# Patient Record
Sex: MALE
[De-identification: names, ages, dates, MRNs, and addresses within clinical notes are randomized per-mention and may not be internally consistent; named-entity substitution may affect disease eponyms.]

---

## 2024-02-22 PROBLEM — Z00.00 ENCOUNTER FOR PREVENTIVE HEALTH EXAMINATION: Status: ACTIVE | Noted: 2024-02-22

## 2024-02-28 ENCOUNTER — APPOINTMENT (OUTPATIENT)
Dept: COLORECTAL SURGERY | Facility: CLINIC | Age: 74
End: 2024-02-28
Payer: MEDICARE

## 2024-02-28 VITALS
RESPIRATION RATE: 14 BRPM | HEIGHT: 68 IN | WEIGHT: 178 LBS | HEART RATE: 58 BPM | SYSTOLIC BLOOD PRESSURE: 124 MMHG | DIASTOLIC BLOOD PRESSURE: 80 MMHG | BODY MASS INDEX: 26.98 KG/M2 | TEMPERATURE: 97.5 F

## 2024-02-28 DIAGNOSIS — Z86.79 PERSONAL HISTORY OF OTHER DISEASES OF THE CIRCULATORY SYSTEM: ICD-10-CM

## 2024-02-28 DIAGNOSIS — K63.89 OTHER SPECIFIED DISEASES OF INTESTINE: ICD-10-CM

## 2024-02-28 DIAGNOSIS — Z86.39 PERSONAL HISTORY OF OTHER ENDOCRINE, NUTRITIONAL AND METABOLIC DISEASE: ICD-10-CM

## 2024-02-28 PROCEDURE — 99205 OFFICE O/P NEW HI 60 MIN: CPT

## 2024-02-28 RX ORDER — AMLODIPINE BESYLATE 10 MG/1
10 TABLET ORAL
Refills: 0 | Status: ACTIVE | COMMUNITY

## 2024-02-28 RX ORDER — LISINOPRIL 40 MG/1
40 TABLET ORAL
Refills: 0 | Status: ACTIVE | COMMUNITY

## 2024-02-28 RX ORDER — CHROMIUM 200 MCG
TABLET ORAL
Refills: 0 | Status: ACTIVE | COMMUNITY

## 2024-02-28 RX ORDER — EZETIMIBE 10 MG/1
10 TABLET ORAL
Refills: 0 | Status: ACTIVE | COMMUNITY

## 2024-02-28 RX ORDER — ATENOLOL 25 MG/1
25 TABLET ORAL
Refills: 0 | Status: ACTIVE | COMMUNITY

## 2024-02-28 RX ORDER — ATORVASTATIN CALCIUM 20 MG/1
20 TABLET, FILM COATED ORAL
Refills: 0 | Status: ACTIVE | COMMUNITY

## 2024-02-28 RX ORDER — ASPIRIN 325 MG/1
325 TABLET, COATED ORAL
Refills: 0 | Status: ACTIVE | COMMUNITY

## 2024-02-28 RX ORDER — METHIMAZOLE 5 MG/1
5 TABLET ORAL
Refills: 0 | Status: ACTIVE | COMMUNITY

## 2024-02-29 NOTE — PHYSICAL EXAM
[Normal Breath Sounds] : Normal breath sounds [Normal Heart Sounds] : normal heart sounds [Normal Rate and Rhythm] : normal rate and rhythm [No Edema] : No edema [Alert] : alert [Oriented to Person] : oriented to person [Oriented to Place] : oriented to place [Oriented to Time] : oriented to time [Calm] : calm [de-identified] : round soft +BS NT slightly distended [de-identified] : NC/AT [de-identified] : intact [de-identified] : +ROM

## 2024-02-29 NOTE — HISTORY OF PRESENT ILLNESS
[FreeTextEntry1] : 74yo [place] male who developed new onset constipation in September 2023. The constipation worsened over time and he subsequently developed abdominal pain, occasional blood in his stool, and a 12lb unintended weight loss since December 2023. Patient mentioned his symptoms to his cardiologist who referred patient to GI Dr. Farrar for colonoscopy. Colonoscopy was postponed to February after the patient had COVID in January 2024. An apple-core left side lesion which could be transversed with the scope was found, biopsy is pending.   The official colonoscopy report and CT C/A/P reports are not available in our system as of the time of this appointment.

## 2024-02-29 NOTE — ASSESSMENT
[FreeTextEntry1] : Pleasant 73-year-old male who presents for consultation regarding newly diagnosed carcinoma of the left colon.  He developed constipation with new abdominal pain and weight loss in fall/winter 2023. Unfortunately screening colonoscopy was postponed to February 2024 as the patient caught COVID. He was found to have an apple-core lesion of the descending  colon which could be traversed with the scope. Biopsy results are pending. He also underwent CT C/A/P and the results are not yet in our system.    His past medical history includes hypertension. His only surgical history includes testicular surgery.  We will begin by obtaining  the results of CT scan of the chest, abdomen and pelvis. If no abnormalities are seen  we plan to proceed with surgical resection. Laparoscopic assisted left colectomy was discussed with the patient including anticipated operative time, hospital stay and time to complete recuperation. Risks, alternatives and benefits including but not limited to anastomotic leak, wound infection and deep venous thrombosis were discussed. Final pathologic staging including the possible need for adjuvant chemotherapy was also discussed.  Questions were answered and he wishes to proceed. We will be doing this procedure with an ERP protocol.

## 2024-02-29 NOTE — REVIEW OF SYSTEMS
[As Noted in HPI] : as noted in HPI [Negative] : Endocrine [Chest Pain] : no chest pain [Cough] : no cough [Easy Bleeding] : no tendency for easy bleeding [Easy Bruising] : no tendency for easy bruising [FreeTextEntry5] : ST scheduled for June 2024 (last ST Feb 2023

## 2024-03-08 ENCOUNTER — OUTPATIENT (OUTPATIENT)
Dept: OUTPATIENT SERVICES | Facility: HOSPITAL | Age: 74
LOS: 1 days | End: 2024-03-08
Payer: MEDICARE

## 2024-03-08 VITALS
RESPIRATION RATE: 18 BRPM | TEMPERATURE: 99 F | HEIGHT: 67 IN | DIASTOLIC BLOOD PRESSURE: 79 MMHG | OXYGEN SATURATION: 96 % | SYSTOLIC BLOOD PRESSURE: 120 MMHG | HEART RATE: 57 BPM | WEIGHT: 182.1 LBS

## 2024-03-08 DIAGNOSIS — K63.89 OTHER SPECIFIED DISEASES OF INTESTINE: ICD-10-CM

## 2024-03-08 DIAGNOSIS — Z98.890 OTHER SPECIFIED POSTPROCEDURAL STATES: Chronic | ICD-10-CM

## 2024-03-08 DIAGNOSIS — Z01.818 ENCOUNTER FOR OTHER PREPROCEDURAL EXAMINATION: ICD-10-CM

## 2024-03-08 DIAGNOSIS — Z29.9 ENCOUNTER FOR PROPHYLACTIC MEASURES, UNSPECIFIED: ICD-10-CM

## 2024-03-08 LAB
A1C WITH ESTIMATED AVERAGE GLUCOSE RESULT: 5.7 % — HIGH (ref 4–5.6)
ANION GAP SERPL CALC-SCNC: 9 MMOL/L — SIGNIFICANT CHANGE UP (ref 5–17)
BLD GP AB SCN SERPL QL: NEGATIVE — SIGNIFICANT CHANGE UP
BUN SERPL-MCNC: 13 MG/DL — SIGNIFICANT CHANGE UP (ref 7–23)
CALCIUM SERPL-MCNC: 9.8 MG/DL — SIGNIFICANT CHANGE UP (ref 8.4–10.5)
CEA SERPL-MCNC: 1 NG/ML — SIGNIFICANT CHANGE UP (ref 0–3.8)
CHLORIDE SERPL-SCNC: 104 MMOL/L — SIGNIFICANT CHANGE UP (ref 96–108)
CO2 SERPL-SCNC: 28 MMOL/L — SIGNIFICANT CHANGE UP (ref 22–31)
CREAT SERPL-MCNC: 1.04 MG/DL — SIGNIFICANT CHANGE UP (ref 0.5–1.3)
EGFR: 75 ML/MIN/1.73M2 — SIGNIFICANT CHANGE UP
ESTIMATED AVERAGE GLUCOSE: 117 MG/DL — HIGH (ref 68–114)
GLUCOSE SERPL-MCNC: 107 MG/DL — HIGH (ref 70–99)
HCT VFR BLD CALC: 46 % — SIGNIFICANT CHANGE UP (ref 39–50)
HGB BLD-MCNC: 15.5 G/DL — SIGNIFICANT CHANGE UP (ref 13–17)
MCHC RBC-ENTMCNC: 27.9 PG — SIGNIFICANT CHANGE UP (ref 27–34)
MCHC RBC-ENTMCNC: 33.7 GM/DL — SIGNIFICANT CHANGE UP (ref 32–36)
MCV RBC AUTO: 82.7 FL — SIGNIFICANT CHANGE UP (ref 80–100)
NRBC # BLD: 0 /100 WBCS — SIGNIFICANT CHANGE UP (ref 0–0)
PLATELET # BLD AUTO: 203 K/UL — SIGNIFICANT CHANGE UP (ref 150–400)
POTASSIUM SERPL-MCNC: 4.3 MMOL/L — SIGNIFICANT CHANGE UP (ref 3.5–5.3)
POTASSIUM SERPL-SCNC: 4.3 MMOL/L — SIGNIFICANT CHANGE UP (ref 3.5–5.3)
RBC # BLD: 5.56 M/UL — SIGNIFICANT CHANGE UP (ref 4.2–5.8)
RBC # FLD: 14.3 % — SIGNIFICANT CHANGE UP (ref 10.3–14.5)
RH IG SCN BLD-IMP: POSITIVE — SIGNIFICANT CHANGE UP
SODIUM SERPL-SCNC: 141 MMOL/L — SIGNIFICANT CHANGE UP (ref 135–145)
WBC # BLD: 12.25 K/UL — HIGH (ref 3.8–10.5)
WBC # FLD AUTO: 12.25 K/UL — HIGH (ref 3.8–10.5)

## 2024-03-08 PROCEDURE — 80048 BASIC METABOLIC PNL TOTAL CA: CPT

## 2024-03-08 PROCEDURE — G0463: CPT

## 2024-03-08 PROCEDURE — 82378 CARCINOEMBRYONIC ANTIGEN: CPT

## 2024-03-08 PROCEDURE — 36415 COLL VENOUS BLD VENIPUNCTURE: CPT

## 2024-03-08 PROCEDURE — 86850 RBC ANTIBODY SCREEN: CPT

## 2024-03-08 PROCEDURE — 86901 BLOOD TYPING SEROLOGIC RH(D): CPT

## 2024-03-08 PROCEDURE — 86900 BLOOD TYPING SEROLOGIC ABO: CPT

## 2024-03-08 PROCEDURE — 83036 HEMOGLOBIN GLYCOSYLATED A1C: CPT

## 2024-03-08 PROCEDURE — 85027 COMPLETE CBC AUTOMATED: CPT

## 2024-03-08 NOTE — H&P PST ADULT - PROBLEM SELECTOR PLAN 1
Scheduled for laparoscopic lower anterior resection   preop instruction provided in writing and discussed, both patient and his wife verbalized understanding and teach back   fs on admit  ERP protocol   patient will continue aspirin perio-op

## 2024-03-08 NOTE — H&P PST ADULT - HISTORY OF PRESENT ILLNESS
73 year old male with h/o previous MI s/p cardiac angio with stenting x 25 years ago (on aspirin, denies any CP), hypertension, thyroid nodule, hypercholesterolemia, renal calculus in the past, presents to [facility] for scheduled laparoscopic lower anterior resection for disease of intestine. He denies any abdomina pain, no rectal bleeding, no N/V. Endorses having difficulty moving his bowel, prompted a colonoscopy, which revealed a large colon mass, now scheduled for surgery. 73 year old male with h/o previous MI s/p cardiac cath with stenting x 25 years ago (on aspirin, denies any CP), hypertension, thyroid nodule, hypercholesterolemia, BPH, renal calculus in the past, presents to [facility] for scheduled laparoscopic lower anterior resection for disease of intestine. He denies any abdomina pain, no rectal bleeding, no N/V. Endorses having difficulty moving his bowel, prompted a colonoscopy, which revealed a large colon mass, now scheduled for surgery.

## 2024-03-08 NOTE — H&P PST ADULT - NSICDXPASTMEDICALHX_GEN_ALL_CORE_FT
PAST MEDICAL HISTORY:  H/O basal cell carcinoma excision     H/O thyroid nodule     History of BPH     Hypercholesterolemia     Hypertension     MI (myocardial infarction)     Other specified diseases of intestine     Renal calculus      PAST MEDICAL HISTORY:  H/O basal cell carcinoma excision     H/O thyroid nodule     History of BPH     Hypercholesterolemia     Hypertension     MI (myocardial infarction)     Other specified diseases of intestine     Renal calculus     S/P repair of hydrocele

## 2024-03-08 NOTE — H&P PST ADULT - ASSESSMENT
Dasi activities: walking 1-1.5 miles (3x/week) Dasi activities: walking 1-1.5 miles (3x/week), able to walk up 1-2 flight stair without sob, no CP  Dasi score: 6.04  no loose or removable teeth     CAPRINI SCORE [CLOT updated 18]    AGE RELATED RISK FACTORS                                                       MOBILITY RELATED FACTORS  [ ] Age 41-60 years                                            (1 Point)                    [ ] Bed rest                                                        (1 Point)  [ ] Age: 61-74 years                                           (2 Points)                  [ ] Plaster cast                                                   (2 Points)  [ 3] Age= 75 years                                              (3 Points)                    [ ] Bed bound for more than 72 hours                 (2 Points)    DISEASE RELATED RISK FACTORS                                               GENDER SPECIFIC FACTORS  [ ] Edema in the lower extremities                       (1 Point)              [ ] Pregnancy                                                     (1 Point)  [ ] Varicose veins                                               (1 Point)                     [ ] Post-partum < 6 weeks                                   (1 Point)             [1 ] BMI > 25 Kg/m2                                            (1 Point)                     [ ] Hormonal therapy  or oral contraception          (1 Point)                 [ ] Sepsis (in the previous month)                        (1 Point)               [ ] History of pregnancy complications                 (1 point)  [ ] Pneumonia or serious lung disease                                               [ ] Unexplained or recurrent                     (1 Point)           (in the previous month)                               (1 Point)  [ ] Abnormal pulmonary function test                     (1 Point)                 SURGERY RELATED RISK FACTORS  [ ] Acute myocardial infarction                              (1 Point)               [ ]  Section                                             (1 Point)  [ ] Congestive heart failure (in the previous month)  (1 Point)      [ ] Minor surgery                                                  (1 Point)   [ ] Inflammatory bowel disease                             (1 Point)               [ ] Arthroscopic surgery                                        (2 Points)  [ ] Central venous access                                      (2 Points)                [2 ] General surgery lasting more than 45 minutes (2 points)  [2 ] Present or previous malignancy                     (2 Points)                [ ] Elective arthroplasty                                         (5 points)    [ ] Stroke (in the previous month)                          (5 Points)                                                                                                                                                           HEMATOLOGY RELATED FACTORS                                                 TRAUMA RELATED RISK FACTORS  [ ] Prior episodes of VTE                                     (3 Points)                [ ] Fracture of the hip, pelvis, or leg                       (5 Points)  [ ] Positive family history for VTE                         (3 Points)             [ ] Acute spinal cord injury (in the previous month)  (5 Points)  [ ] Prothrombin 10410 A                                     (3 Points)               [ ] Paralysis  (less than 1 month)                             (5 Points)  [ ] Factor V Leiden                                             (3 Points)                  [ ] Multiple Trauma within 1 month                        (5 Points)  [ ] Lupus anticoagulants                                     (3 Points)                                                           [ ] Anticardiolipin antibodies                               (3 Points)                                                       [ ] High homocysteine in the blood                      (3 Points)                                             [ ] Other congenital or acquired thrombophilia      (3 Points)                                                [ ] Heparin induced thrombocytopenia                  (3 Points)                                     Total Score [ 7]

## 2024-03-08 NOTE — H&P PST ADULT - NS PRO FEM  PAP SMEARS 3YRS
From: Amita Harvey  To: Tim Robert  Sent: 9/26/2023 9:09 AM CDT  Subject: extension on work letter    Heltyler Robert, I was in yesterday and discussed if needed another day off of work I could just send a message. I did have to stay home today due to the cough is still constant and the cough syrup causing slight allergic reaction last night (hives with a little swelling of the eyes). I did take benadryl and it did help. I will continue the pills as I was only able to take 1 yesterday as they weren't ready for  until 7pm.   
not applicable (Male)

## 2024-03-08 NOTE — H&P PST ADULT - AS BP NONINV METHOD
Electrodesiccation And Curettage Text: The wound bed was treated with electrodesiccation and curettage after the biopsy was performed. electronic

## 2024-03-08 NOTE — H&P PST ADULT - NSICDXPROCEDURE_GEN_ALL_CORE_FT
PROCEDURES:  Resection, colon, anterior 08-Mar-2024 08:57:49 disease of intestine Elizabeth Norton

## 2024-03-08 NOTE — H&P PST ADULT - NSICDXPASTSURGICALHX_GEN_ALL_CORE_FT
PAST SURGICAL HISTORY:  H/O basal cell carcinoma excision     H/O coronary angiogram     History of colonoscopy     History of coronary angiogram

## 2024-03-11 ENCOUNTER — TRANSCRIPTION ENCOUNTER (OUTPATIENT)
Age: 74
End: 2024-03-11

## 2024-03-12 ENCOUNTER — RESULT REVIEW (OUTPATIENT)
Age: 74
End: 2024-03-12

## 2024-03-12 ENCOUNTER — INPATIENT (INPATIENT)
Facility: HOSPITAL | Age: 74
LOS: 1 days | Discharge: HOME | DRG: 330 | End: 2024-03-14
Attending: SURGERY | Admitting: SURGERY
Payer: MEDICARE

## 2024-03-12 ENCOUNTER — APPOINTMENT (OUTPATIENT)
Dept: COLORECTAL SURGERY | Facility: HOSPITAL | Age: 74
End: 2024-03-12
Payer: MEDICARE

## 2024-03-12 VITALS
SYSTOLIC BLOOD PRESSURE: 136 MMHG | WEIGHT: 182.1 LBS | OXYGEN SATURATION: 97 % | TEMPERATURE: 98 F | DIASTOLIC BLOOD PRESSURE: 59 MMHG | HEART RATE: 61 BPM | HEIGHT: 67.01 IN | RESPIRATION RATE: 17 BRPM

## 2024-03-12 DIAGNOSIS — Z98.890 OTHER SPECIFIED POSTPROCEDURAL STATES: Chronic | ICD-10-CM

## 2024-03-12 DIAGNOSIS — K63.89 OTHER SPECIFIED DISEASES OF INTESTINE: ICD-10-CM

## 2024-03-12 LAB
GLUCOSE BLDC GLUCOMTR-MCNC: 208 MG/DL — HIGH (ref 70–99)
RH IG SCN BLD-IMP: POSITIVE — SIGNIFICANT CHANGE UP

## 2024-03-12 PROCEDURE — 88342 IMHCHEM/IMCYTCHM 1ST ANTB: CPT | Mod: 26

## 2024-03-12 PROCEDURE — 52005 CYSTO W/URTRL CATHJ: CPT

## 2024-03-12 PROCEDURE — 44207 L COLECTOMY/COLOPROCTOSTOMY: CPT

## 2024-03-12 PROCEDURE — 88304 TISSUE EXAM BY PATHOLOGIST: CPT | Mod: 26

## 2024-03-12 PROCEDURE — 44213 LAP MOBIL SPLENIC FL ADD-ON: CPT

## 2024-03-12 PROCEDURE — 88341 IMHCHEM/IMCYTCHM EA ADD ANTB: CPT | Mod: 26

## 2024-03-12 PROCEDURE — 45300 PROCTOSIGMOIDOSCOPY DX: CPT

## 2024-03-12 PROCEDURE — 88309 TISSUE EXAM BY PATHOLOGIST: CPT | Mod: 26

## 2024-03-12 DEVICE — VISTASEAL FIBRIN HUMAN 4ML: Type: IMPLANTABLE DEVICE | Status: FUNCTIONAL

## 2024-03-12 DEVICE — GUIDEWIRE SENSOR DUAL-FLEX NITINOL STRAIGHT .035" X 150CM: Type: IMPLANTABLE DEVICE | Status: FUNCTIONAL

## 2024-03-12 DEVICE — STAPLER ETHICON CIRCULAR XL 29MM: Type: IMPLANTABLE DEVICE | Status: FUNCTIONAL

## 2024-03-12 DEVICE — URETERAL CATH OPEN END 5FR 70CM: Type: IMPLANTABLE DEVICE | Status: FUNCTIONAL

## 2024-03-12 DEVICE — STAPLER COVIDIEN PURSTRING 65MM: Type: IMPLANTABLE DEVICE | Status: FUNCTIONAL

## 2024-03-12 DEVICE — STAPLER CONTOUR CURVED WITH BLUE CART: Type: IMPLANTABLE DEVICE | Status: FUNCTIONAL

## 2024-03-12 RX ORDER — ATENOLOL 25 MG/1
25 TABLET ORAL AT BEDTIME
Refills: 0 | Status: DISCONTINUED | OUTPATIENT
Start: 2024-03-12 | End: 2024-03-14

## 2024-03-12 RX ORDER — HYDROMORPHONE HYDROCHLORIDE 2 MG/ML
0.25 INJECTION INTRAMUSCULAR; INTRAVENOUS; SUBCUTANEOUS
Refills: 0 | Status: DISCONTINUED | OUTPATIENT
Start: 2024-03-12 | End: 2024-03-12

## 2024-03-12 RX ORDER — METHIMAZOLE 10 MG/1
1 TABLET ORAL
Refills: 0 | DISCHARGE

## 2024-03-12 RX ORDER — INFLUENZA VIRUS VACCINE 15; 15; 15; 15 UG/.5ML; UG/.5ML; UG/.5ML; UG/.5ML
0.7 SUSPENSION INTRAMUSCULAR ONCE
Refills: 0 | Status: DISCONTINUED | OUTPATIENT
Start: 2024-03-12 | End: 2024-03-14

## 2024-03-12 RX ORDER — ATORVASTATIN CALCIUM 80 MG/1
1 TABLET, FILM COATED ORAL
Refills: 0 | DISCHARGE

## 2024-03-12 RX ORDER — SODIUM CHLORIDE 9 MG/ML
1000 INJECTION, SOLUTION INTRAVENOUS
Refills: 0 | Status: DISCONTINUED | OUTPATIENT
Start: 2024-03-12 | End: 2024-03-14

## 2024-03-12 RX ORDER — MORPHINE SULFATE 50 MG/1
0.2 CAPSULE, EXTENDED RELEASE ORAL ONCE
Refills: 0 | Status: DISCONTINUED | OUTPATIENT
Start: 2024-03-12 | End: 2024-03-13

## 2024-03-12 RX ORDER — HYDROMORPHONE HYDROCHLORIDE 2 MG/ML
0.5 INJECTION INTRAMUSCULAR; INTRAVENOUS; SUBCUTANEOUS
Refills: 0 | Status: DISCONTINUED | OUTPATIENT
Start: 2024-03-12 | End: 2024-03-13

## 2024-03-12 RX ORDER — CEFOTETAN DISODIUM 1 G
2 VIAL (EA) INJECTION ONCE
Refills: 0 | Status: DISCONTINUED | OUTPATIENT
Start: 2024-03-12 | End: 2024-03-13

## 2024-03-12 RX ORDER — LIDOCAINE HCL 20 MG/ML
0.2 VIAL (ML) INJECTION ONCE
Refills: 0 | Status: CANCELLED | OUTPATIENT
Start: 2024-03-14 | End: 2024-03-12

## 2024-03-12 RX ORDER — OXYCODONE HYDROCHLORIDE 5 MG/1
5 TABLET ORAL EVERY 4 HOURS
Refills: 0 | Status: DISCONTINUED | OUTPATIENT
Start: 2024-03-12 | End: 2024-03-14

## 2024-03-12 RX ORDER — CELECOXIB 200 MG/1
400 CAPSULE ORAL ONCE
Refills: 0 | Status: COMPLETED | OUTPATIENT
Start: 2024-03-12 | End: 2024-03-12

## 2024-03-12 RX ORDER — AMLODIPINE BESYLATE 2.5 MG/1
1 TABLET ORAL
Refills: 0 | DISCHARGE

## 2024-03-12 RX ORDER — LISINOPRIL 2.5 MG/1
1 TABLET ORAL
Refills: 0 | DISCHARGE

## 2024-03-12 RX ORDER — AMLODIPINE BESYLATE 2.5 MG/1
10 TABLET ORAL DAILY
Refills: 0 | Status: DISCONTINUED | OUTPATIENT
Start: 2024-03-12 | End: 2024-03-14

## 2024-03-12 RX ORDER — EZETIMIBE 10 MG/1
1 TABLET ORAL
Refills: 0 | DISCHARGE

## 2024-03-12 RX ORDER — ONDANSETRON 8 MG/1
4 TABLET, FILM COATED ORAL EVERY 6 HOURS
Refills: 0 | Status: DISCONTINUED | OUTPATIENT
Start: 2024-03-12 | End: 2024-03-13

## 2024-03-12 RX ORDER — ATENOLOL 25 MG/1
1 TABLET ORAL
Refills: 0 | DISCHARGE

## 2024-03-12 RX ORDER — ACETAMINOPHEN 500 MG
1000 TABLET ORAL EVERY 6 HOURS
Refills: 0 | Status: COMPLETED | OUTPATIENT
Start: 2024-03-12 | End: 2024-03-13

## 2024-03-12 RX ORDER — ASPIRIN/CALCIUM CARB/MAGNESIUM 324 MG
1 TABLET ORAL
Refills: 0 | DISCHARGE

## 2024-03-12 RX ORDER — CHLORHEXIDINE GLUCONATE 213 G/1000ML
1 SOLUTION TOPICAL ONCE
Refills: 0 | Status: DISCONTINUED | OUTPATIENT
Start: 2024-03-12 | End: 2024-03-12

## 2024-03-12 RX ORDER — NALOXONE HYDROCHLORIDE 4 MG/.1ML
0.1 SPRAY NASAL
Refills: 0 | Status: DISCONTINUED | OUTPATIENT
Start: 2024-03-12 | End: 2024-03-13

## 2024-03-12 RX ORDER — OXYCODONE HYDROCHLORIDE 5 MG/1
2.5 TABLET ORAL EVERY 4 HOURS
Refills: 0 | Status: DISCONTINUED | OUTPATIENT
Start: 2024-03-12 | End: 2024-03-14

## 2024-03-12 RX ORDER — HEPARIN SODIUM 5000 [USP'U]/ML
5000 INJECTION INTRAVENOUS; SUBCUTANEOUS EVERY 8 HOURS
Refills: 0 | Status: DISCONTINUED | OUTPATIENT
Start: 2024-03-12 | End: 2024-03-14

## 2024-03-12 RX ORDER — SODIUM CHLORIDE 9 MG/ML
3 INJECTION INTRAMUSCULAR; INTRAVENOUS; SUBCUTANEOUS EVERY 8 HOURS
Refills: 0 | Status: CANCELLED | OUTPATIENT
Start: 2024-03-14 | End: 2024-03-12

## 2024-03-12 RX ADMIN — HEPARIN SODIUM 5000 UNIT(S): 5000 INJECTION INTRAVENOUS; SUBCUTANEOUS at 22:27

## 2024-03-12 RX ADMIN — CELECOXIB 400 MILLIGRAM(S): 200 CAPSULE ORAL at 12:14

## 2024-03-12 RX ADMIN — Medication 400 MILLIGRAM(S): at 22:27

## 2024-03-12 RX ADMIN — Medication 1000 MILLIGRAM(S): at 22:57

## 2024-03-12 NOTE — BRIEF OPERATIVE NOTE - OPERATION/FINDINGS
Pre-op bilateral ucath placement by urology. Lower midline incision, sigmoid mobilized from top of rectum to sigmoid. Mass noted at mid descending colon. Hand assist port placed in incision, supraumbilical eduardo port placed and mid left abdominal port placed. Colon mobilized from descending to mid transverse, high splenic flexure takedown. High ligation of CEASAR. Bowel extracorporealized, top of rectum transected with contour stapler, proximal margin of specimen transected with pursestring device 8cm proximal to mass. Colorectal anastomosis performed with 29 EEA stapler, donuts intact. Rigid sig performed, anastomosis 17 cm from anal verge, negative leak test.

## 2024-03-12 NOTE — PATIENT PROFILE ADULT - HAS THE PATIENT RECEIVED THE INFLUENZA VACCINE THIS SEASON?
BRIEF OPERATIVE NOTE    Date of Procedure: 11/13/2018   Preoperative Diagnosis:  Lymphadenopathy. Postoperative Diagnosis:  Same. Procedure(s):   Excisional Biopsy Right Inguinal Lymph Node. Surgeon(s) and Role:   Jessica Quijano MD - Primary  Surgical Staff:  Circ-1: Akiko Michael RN  Scrub RN-1: Flex Jung RN  Surg Asst-1: Tawanda Earthly  Event Time In Time Out   Incision Start 11/13/2018 1319    Incision Close 11/13/2018 1345      Anesthesia: General   Estimated Blood Loss: Minimal.  Specimens:   ID Type Source Tests Collected by Time Destination   1 : right inguinal lymph node Fresh Lymph Node  Jessica Quijano MD 11/13/2018 1333 Pathology      Findings: Enlarged right inguinal lymph node. Complications: None.   Implants: * No implants in log * no...

## 2024-03-12 NOTE — PRE-ANESTHESIA EVALUATION ADULT - NSANTHPMHFT_GEN_ALL_CORE
chart and consultant notes reviewed. cad s/p remote stent - stable cv clinical baseline, states et > 1 flight, no cp/sob. 2023 stress shows no reversible defect, ef 50%

## 2024-03-12 NOTE — CHART NOTE - NSCHARTNOTEFT_GEN_A_CORE
POST-OP NOTE    HONEY VALENZUELA | [id number] | [facility] 2MON 210 W1    Procedure: s/p     Subjective:    Vital Signs Last 24 Hrs  T(C): 36.4 (12 Mar 2024 22:25), Max: 36.8 (12 Mar 2024 12:18)  T(F): 97.6 (12 Mar 2024 22:25), Max: 98.2 (12 Mar 2024 12:18)  HR: 58 (12 Mar 2024 22:25) (55 - 68)  BP: 121/60 (12 Mar 2024 22:25) (86/59 - 136/59)  BP(mean): 79 (12 Mar 2024 21:15) (68 - 86)  RR: 16 (12 Mar 2024 22:25) (16 - 17)  SpO2: 99% (12 Mar 2024 22:25) (95% - 100%)    Parameters below as of 12 Mar 2024 22:25  Patient On (Oxygen Delivery Method): nasal cannula  O2 Flow (L/min): 2    I&O's Summary    12 Mar 2024 07:01  -  12 Mar 2024 23:33  --------------------------------------------------------  IN: 120 mL / OUT: 135 mL / NET: -15 mL                 PHYSICAL EXAM:  Gen: NAD  Resp: breathing easily, no stridor  CV: RRR  Abdomen: soft, nontender, nondistended  Skin: Incision c/d/i. Normal color, no rashes or lesions POST-OP NOTE    HONEY VALENZUELA | [id number] | [facility] 2MON [address]1    Procedure: s/p      Subjective:    Vital Signs Last 24 Hrs  T(C): 36.4 (12 Mar 2024 22:25), Max: 36.8 (12 Mar 2024 12:18)  T(F): 97.6 (12 Mar 2024 22:25), Max: 98.2 (12 Mar 2024 12:18)  HR: 58 (12 Mar 2024 22:25) (55 - 68)  BP: 121/60 (12 Mar 2024 22:25) (86/59 - 136/59)  BP(mean): 79 (12 Mar 2024 21:15) (68 - 86)  RR: 16 (12 Mar 2024 22:25) (16 - 17)  SpO2: 99% (12 Mar 2024 22:25) (95% - 100%)    Parameters below as of 12 Mar 2024 22:25  Patient On (Oxygen Delivery Method): nasal cannula  O2 Flow (L/min): 2    I&O's Summary    12 Mar 2024 07:01  -  12 Mar 2024 23:33  --------------------------------------------------------  IN: 120 mL / OUT: 135 mL / NET: -15 mL                 PHYSICAL EXAM:  Gen: NAD  Resp: breathing easily, no stridor  CV: RRR  Abdomen: soft, nontender, nondistended  Skin: Incision c/d/i. Normal color, no rashes or lesions POST-OP NOTE    HONEY VALENZUELA | [id number] | [facility] 2MON [address] W1    Procedure: s/p lap hand assisted anterior resection with primary anastomosis.     Subjective: pt reports minimal pain.  tolerated CLD. OOB. no flatus. no BM    Vital Signs Last 24 Hrs  T(C): 36.4 (12 Mar 2024 22:25), Max: 36.8 (12 Mar 2024 12:18)  T(F): 97.6 (12 Mar 2024 22:25), Max: 98.2 (12 Mar 2024 12:18)  HR: 58 (12 Mar 2024 22:25) (55 - 68)  BP: 121/60 (12 Mar 2024 22:25) (86/59 - 136/59)  BP(mean): 79 (12 Mar 2024 21:15) (68 - 86)  RR: 16 (12 Mar 2024 22:25) (16 - 17)  SpO2: 99% (12 Mar 2024 22:25) (95% - 100%)    Parameters below as of 12 Mar 2024 22:25  Patient On (Oxygen Delivery Method): nasal cannula  O2 Flow (L/min): 2    I&O's Summary    12 Mar 2024 07:01  -  12 Mar 2024 23:33  --------------------------------------------------------  IN: 120 mL / OUT: 135 mL / NET: -15 mL                 PHYSICAL EXAM:  Gen: NAD  Resp: breathing easily, no stridor  CV: RRR  : goodrich in place with blood in urine.   Abdomen: soft, nontender, mildly distended. Incision sites x4 with dressings c/d/i.    A/P: 73M with left sided colon CA s/p laparoscopic hand assisted anterior resection with primary anastomosis. pt received spinal Duramorph intraop. pt is recovering well.     - ERP  - goodrich to be removed AM  - pain control  - SQH  - CLD   - OOB    Red Team surgery POST-OP NOTE    HONEY VALENZUELA | [id number] | [facility] 2MON [address] W1    Procedure: s/p lap hand assisted anterior resection with primary anastomosis.     Subjective: pt reports minimal pain. Denies nausea, vomiting. tolerated CLD. OOB. no flatus. no BM    Vital Signs Last 24 Hrs  T(C): 36.4 (12 Mar 2024 22:25), Max: 36.8 (12 Mar 2024 12:18)  T(F): 97.6 (12 Mar 2024 22:25), Max: 98.2 (12 Mar 2024 12:18)  HR: 58 (12 Mar 2024 22:25) (55 - 68)  BP: 121/60 (12 Mar 2024 22:25) (86/59 - 136/59)  BP(mean): 79 (12 Mar 2024 21:15) (68 - 86)  RR: 16 (12 Mar 2024 22:25) (16 - 17)  SpO2: 99% (12 Mar 2024 22:25) (95% - 100%)    Parameters below as of 12 Mar 2024 22:25  Patient On (Oxygen Delivery Method): nasal cannula  O2 Flow (L/min): 2    I&O's Summary    12 Mar 2024 07:01  -  12 Mar 2024 23:33  --------------------------------------------------------  IN: 120 mL / OUT: 135 mL / NET: -15 mL                 PHYSICAL EXAM:  Gen: NAD  Resp: breathing easily, no stridor  CV: RRR  : goodrich in place with blood in urine.   Abdomen: soft, nontender, mildly distended. Incision sites x4 with dressings c/d/i.    A/P: 73M with left sided colon CA s/p laparoscopic hand assisted anterior resection with primary anastomosis. pt received spinal Duramorph intraop. pt is recovering well.     - ERP  - mointor goodrich output  - pain control  - SQH  - CLD   - OOB    Red Team surgery POST-OP NOTE    HONEY VALENZUELA | [id number] | [facility] 2MON 210 W1    Procedure: s/p lap hand assisted anterior resection with primary anastomosis.     Subjective: pt reports minimal pain. Denies nausea, vomiting. tolerated CLD. OOB. no flatus. no BM    Vital Signs Last 24 Hrs  T(C): 36.4 (12 Mar 2024 22:25), Max: 36.8 (12 Mar 2024 12:18)  T(F): 97.6 (12 Mar 2024 22:25), Max: 98.2 (12 Mar 2024 12:18)  HR: 58 (12 Mar 2024 22:25) (55 - 68)  BP: 121/60 (12 Mar 2024 22:25) (86/59 - 136/59)  BP(mean): 79 (12 Mar 2024 21:15) (68 - 86)  RR: 16 (12 Mar 2024 22:25) (16 - 17)  SpO2: 99% (12 Mar 2024 22:25) (95% - 100%)    Parameters below as of 12 Mar 2024 22:25  Patient On (Oxygen Delivery Method): nasal cannula  O2 Flow (L/min): 2    I&O's Summary    12 Mar 2024 07:01  -  12 Mar 2024 23:33  --------------------------------------------------------  IN: 120 mL / OUT: 135 mL / NET: -15 mL                 PHYSICAL EXAM:  Gen: NAD  Resp: breathing easily, no stridor  CV: RRR  : goodrich in place with blood in urine.   Abdomen: soft, nontender, mildly distended. Incision sites dressings c/d/i.    A/P: 73M with left sided colon CA s/p laparoscopic hand assisted anterior resection with primary anastomosis. pt received spinal Duramorph intraop. pt is recovering well.     - ERP  - mointor goodrich output  - pain control  - SQH  - CLD   - OOB    Red Team surgery

## 2024-03-12 NOTE — PATIENT PROFILE ADULT - CHOOSE INDICATION TO IMMUNIZE (AN ORDER WILL BE GENERATED WHEN THIS NOTE IS SAVED):
related to inadequate protein-energy intake and increased physiological demand in the setting of ESRD-HD
Patient is not pregnant (male or female)

## 2024-03-13 LAB
ANION GAP SERPL CALC-SCNC: 14 MMOL/L — SIGNIFICANT CHANGE UP (ref 5–17)
BUN SERPL-MCNC: 22 MG/DL — SIGNIFICANT CHANGE UP (ref 7–23)
CALCIUM SERPL-MCNC: 8.2 MG/DL — LOW (ref 8.4–10.5)
CHLORIDE SERPL-SCNC: 102 MMOL/L — SIGNIFICANT CHANGE UP (ref 96–108)
CO2 SERPL-SCNC: 21 MMOL/L — LOW (ref 22–31)
CREAT SERPL-MCNC: 1.23 MG/DL — SIGNIFICANT CHANGE UP (ref 0.5–1.3)
EGFR: 61 ML/MIN/1.73M2 — SIGNIFICANT CHANGE UP
GLUCOSE SERPL-MCNC: 163 MG/DL — HIGH (ref 70–99)
HCT VFR BLD CALC: 37.2 % — LOW (ref 39–50)
HGB BLD-MCNC: 13.2 G/DL — SIGNIFICANT CHANGE UP (ref 13–17)
MAGNESIUM SERPL-MCNC: 2 MG/DL — SIGNIFICANT CHANGE UP (ref 1.6–2.6)
MCHC RBC-ENTMCNC: 28.3 PG — SIGNIFICANT CHANGE UP (ref 27–34)
MCHC RBC-ENTMCNC: 35.5 GM/DL — SIGNIFICANT CHANGE UP (ref 32–36)
MCV RBC AUTO: 79.7 FL — LOW (ref 80–100)
NRBC # BLD: 0 /100 WBCS — SIGNIFICANT CHANGE UP (ref 0–0)
PHOSPHATE SERPL-MCNC: 3.5 MG/DL — SIGNIFICANT CHANGE UP (ref 2.5–4.5)
PLATELET # BLD AUTO: 165 K/UL — SIGNIFICANT CHANGE UP (ref 150–400)
POTASSIUM SERPL-MCNC: 3.7 MMOL/L — SIGNIFICANT CHANGE UP (ref 3.5–5.3)
POTASSIUM SERPL-SCNC: 3.7 MMOL/L — SIGNIFICANT CHANGE UP (ref 3.5–5.3)
RBC # BLD: 4.67 M/UL — SIGNIFICANT CHANGE UP (ref 4.2–5.8)
RBC # FLD: 14.1 % — SIGNIFICANT CHANGE UP (ref 10.3–14.5)
SODIUM SERPL-SCNC: 137 MMOL/L — SIGNIFICANT CHANGE UP (ref 135–145)
WBC # BLD: 13.39 K/UL — HIGH (ref 3.8–10.5)
WBC # FLD AUTO: 13.39 K/UL — HIGH (ref 3.8–10.5)

## 2024-03-13 RX ORDER — POTASSIUM CHLORIDE 20 MEQ
40 PACKET (EA) ORAL ONCE
Refills: 0 | Status: COMPLETED | OUTPATIENT
Start: 2024-03-13 | End: 2024-03-13

## 2024-03-13 RX ORDER — ACETAMINOPHEN 500 MG
1000 TABLET ORAL EVERY 6 HOURS
Refills: 0 | Status: DISCONTINUED | OUTPATIENT
Start: 2024-03-13 | End: 2024-03-14

## 2024-03-13 RX ORDER — MAGNESIUM OXIDE 400 MG ORAL TABLET 241.3 MG
1000 TABLET ORAL
Refills: 0 | Status: DISCONTINUED | OUTPATIENT
Start: 2024-03-13 | End: 2024-03-14

## 2024-03-13 RX ADMIN — Medication 1000 MILLIGRAM(S): at 18:07

## 2024-03-13 RX ADMIN — Medication 1000 MILLIGRAM(S): at 05:55

## 2024-03-13 RX ADMIN — Medication 1000 MILLIGRAM(S): at 22:56

## 2024-03-13 RX ADMIN — Medication 1000 MILLIGRAM(S): at 23:26

## 2024-03-13 RX ADMIN — Medication 400 MILLIGRAM(S): at 05:25

## 2024-03-13 RX ADMIN — Medication 400 MILLIGRAM(S): at 12:41

## 2024-03-13 RX ADMIN — SODIUM CHLORIDE 40 MILLILITER(S): 9 INJECTION, SOLUTION INTRAVENOUS at 05:25

## 2024-03-13 RX ADMIN — HEPARIN SODIUM 5000 UNIT(S): 5000 INJECTION INTRAVENOUS; SUBCUTANEOUS at 14:30

## 2024-03-13 RX ADMIN — Medication 1000 MILLIGRAM(S): at 13:00

## 2024-03-13 RX ADMIN — HEPARIN SODIUM 5000 UNIT(S): 5000 INJECTION INTRAVENOUS; SUBCUTANEOUS at 21:27

## 2024-03-13 RX ADMIN — HEPARIN SODIUM 5000 UNIT(S): 5000 INJECTION INTRAVENOUS; SUBCUTANEOUS at 05:27

## 2024-03-13 RX ADMIN — ATENOLOL 25 MILLIGRAM(S): 25 TABLET ORAL at 21:27

## 2024-03-13 RX ADMIN — MAGNESIUM OXIDE 400 MG ORAL TABLET 1000 MILLIGRAM(S): 241.3 TABLET ORAL at 17:47

## 2024-03-13 RX ADMIN — AMLODIPINE BESYLATE 10 MILLIGRAM(S): 2.5 TABLET ORAL at 05:28

## 2024-03-13 RX ADMIN — Medication 40 MILLIEQUIVALENT(S): at 12:41

## 2024-03-13 RX ADMIN — Medication 400 MILLIGRAM(S): at 17:50

## 2024-03-13 NOTE — DIETITIAN INITIAL EVALUATION ADULT - ADD RECOMMEND
1) Continue current low fiber with Ensure Surgery 1x daily. 2) Diet education provided, reinforce as needed. 3) RD to remain available and follow-up as medically appropriate.

## 2024-03-13 NOTE — DIETITIAN INITIAL EVALUATION ADULT - PERTINENT LABORATORY DATA
03-13    137  |  102  |  22  ----------------------------<  163<H>  3.7   |  21<L>  |  1.23    Ca    8.2<L>      13 Mar 2024 07:01  Phos  3.5     03-13  Mg     2.0     03-13    A1C with Estimated Average Glucose Result: 5.7 % (03-08-24 @ 11:52)

## 2024-03-13 NOTE — DIETITIAN INITIAL EVALUATION ADULT - RD TO REMAIN AVAILABLE
Jaci Larsen RD, CDN, [facility], Available on Teams/yes

## 2024-03-13 NOTE — PROGRESS NOTE ADULT - ASSESSMENT
73M with left sided colon CA s/p laparoscopic hand assisted anterior resection with primary anastomosis 3/12. Recovering appropriately on ERP.    Plan:  - ERP  - d/c Farris  - LRD  - pain control  - SQH  - OOB    Red Team Surgery  [phone]

## 2024-03-13 NOTE — DIETITIAN INITIAL EVALUATION ADULT - OTHER INFO
Weight: Pt reports weight some weight loss (unclear time frame). States UBW was ~ 191-192lbs now is ~ 182lbs which is consistent with current dosing weight.

## 2024-03-13 NOTE — DIETITIAN INITIAL EVALUATION ADULT - PERTINENT MEDS FT
MEDICATIONS  (STANDING):  acetaminophen     Tablet .. 1000 milliGRAM(s) Oral every 6 hours  acetaminophen   IVPB .. 1000 milliGRAM(s) IV Intermittent every 6 hours  amLODIPine   Tablet 10 milliGRAM(s) Oral daily  atenolol  Tablet 25 milliGRAM(s) Oral at bedtime  heparin   Injectable 5000 Unit(s) SubCutaneous every 8 hours  influenza  Vaccine (HIGH DOSE) 0.7 milliLiter(s) IntraMuscular once  lactated ringers. 1000 milliLiter(s) (40 mL/Hr) IV Continuous <Continuous>  magnesium oxide 1000 milliGRAM(s) Oral two times a day with meals  methimazole 5 milliGRAM(s) Oral daily    MEDICATIONS  (PRN):  oxyCODONE    IR 2.5 milliGRAM(s) Oral every 4 hours PRN Moderate Pain (4 - 6)  oxyCODONE    IR 5 milliGRAM(s) Oral every 4 hours PRN Severe Pain (7 - 10)

## 2024-03-13 NOTE — DIETITIAN INITIAL EVALUATION ADULT - REASON FOR ADMISSION
Other specified disorders of intestines    Chart reviewed, events noted. This is a "73M with left sided colon CA s/p laparoscopic hand assisted anterior resection with primary anastomosis 3/12. Recovering appropriately on ERP."

## 2024-03-13 NOTE — DIETITIAN INITIAL EVALUATION ADULT - NSICDXPASTMEDICALHX_GEN_ALL_CORE_FT
PAST MEDICAL HISTORY:  H/O basal cell carcinoma excision     H/O thyroid nodule     History of BPH     Hypercholesterolemia     Hypertension     MI (myocardial infarction)     Other specified diseases of intestine     Renal calculus     S/P repair of hydrocele

## 2024-03-13 NOTE — DIETITIAN INITIAL EVALUATION ADULT - ENERGY INTAKE
Pt reports good tolerance to clear liquid diet this morning. No acute GI distress noted. Pt receptive to diet education.  Adequate (%)

## 2024-03-13 NOTE — DIETITIAN INITIAL EVALUATION ADULT - NS FNS DIET ORDER
Diet, Low Fiber:   Ensure Surgery Cans or Servings Per Day:  1     Special Instructions for Nursing:  Advance diet to low fiber with 1 ensure surgery if patient tolerates 1 clear liquid tray (03-13-24 @ 09:42)

## 2024-03-14 ENCOUNTER — TRANSCRIPTION ENCOUNTER (OUTPATIENT)
Age: 74
End: 2024-03-14

## 2024-03-14 VITALS
HEART RATE: 69 BPM | RESPIRATION RATE: 18 BRPM | DIASTOLIC BLOOD PRESSURE: 76 MMHG | SYSTOLIC BLOOD PRESSURE: 147 MMHG | OXYGEN SATURATION: 97 % | TEMPERATURE: 98 F

## 2024-03-14 LAB
ANION GAP SERPL CALC-SCNC: 9 MMOL/L — SIGNIFICANT CHANGE UP (ref 5–17)
BUN SERPL-MCNC: 19 MG/DL — SIGNIFICANT CHANGE UP (ref 7–23)
CALCIUM SERPL-MCNC: 8.9 MG/DL — SIGNIFICANT CHANGE UP (ref 8.4–10.5)
CHLORIDE SERPL-SCNC: 108 MMOL/L — SIGNIFICANT CHANGE UP (ref 96–108)
CO2 SERPL-SCNC: 23 MMOL/L — SIGNIFICANT CHANGE UP (ref 22–31)
CREAT SERPL-MCNC: 1 MG/DL — SIGNIFICANT CHANGE UP (ref 0.5–1.3)
EGFR: 78 ML/MIN/1.73M2 — SIGNIFICANT CHANGE UP
GLUCOSE SERPL-MCNC: 115 MG/DL — HIGH (ref 70–99)
HCT VFR BLD CALC: 39.1 % — SIGNIFICANT CHANGE UP (ref 39–50)
HGB BLD-MCNC: 13.5 G/DL — SIGNIFICANT CHANGE UP (ref 13–17)
MAGNESIUM SERPL-MCNC: 2.4 MG/DL — SIGNIFICANT CHANGE UP (ref 1.6–2.6)
MCHC RBC-ENTMCNC: 28.1 PG — SIGNIFICANT CHANGE UP (ref 27–34)
MCHC RBC-ENTMCNC: 34.5 GM/DL — SIGNIFICANT CHANGE UP (ref 32–36)
MCV RBC AUTO: 81.5 FL — SIGNIFICANT CHANGE UP (ref 80–100)
NRBC # BLD: 0 /100 WBCS — SIGNIFICANT CHANGE UP (ref 0–0)
PHOSPHATE SERPL-MCNC: 1.8 MG/DL — LOW (ref 2.5–4.5)
PLATELET # BLD AUTO: 150 K/UL — SIGNIFICANT CHANGE UP (ref 150–400)
POTASSIUM SERPL-MCNC: 4.4 MMOL/L — SIGNIFICANT CHANGE UP (ref 3.5–5.3)
POTASSIUM SERPL-SCNC: 4.4 MMOL/L — SIGNIFICANT CHANGE UP (ref 3.5–5.3)
RBC # BLD: 4.8 M/UL — SIGNIFICANT CHANGE UP (ref 4.2–5.8)
RBC # FLD: 14.6 % — HIGH (ref 10.3–14.5)
SODIUM SERPL-SCNC: 140 MMOL/L — SIGNIFICANT CHANGE UP (ref 135–145)
WBC # BLD: 12.33 K/UL — HIGH (ref 3.8–10.5)
WBC # FLD AUTO: 12.33 K/UL — HIGH (ref 3.8–10.5)

## 2024-03-14 PROCEDURE — 88304 TISSUE EXAM BY PATHOLOGIST: CPT

## 2024-03-14 PROCEDURE — 36415 COLL VENOUS BLD VENIPUNCTURE: CPT

## 2024-03-14 PROCEDURE — 83735 ASSAY OF MAGNESIUM: CPT

## 2024-03-14 PROCEDURE — 88309 TISSUE EXAM BY PATHOLOGIST: CPT

## 2024-03-14 PROCEDURE — 88341 IMHCHEM/IMCYTCHM EA ADD ANTB: CPT

## 2024-03-14 PROCEDURE — 88342 IMHCHEM/IMCYTCHM 1ST ANTB: CPT

## 2024-03-14 PROCEDURE — 84100 ASSAY OF PHOSPHORUS: CPT

## 2024-03-14 PROCEDURE — 80048 BASIC METABOLIC PNL TOTAL CA: CPT

## 2024-03-14 PROCEDURE — C9399: CPT

## 2024-03-14 PROCEDURE — 82962 GLUCOSE BLOOD TEST: CPT

## 2024-03-14 PROCEDURE — 85027 COMPLETE CBC AUTOMATED: CPT

## 2024-03-14 PROCEDURE — C1769: CPT

## 2024-03-14 PROCEDURE — C1889: CPT

## 2024-03-14 PROCEDURE — C1758: CPT

## 2024-03-14 RX ORDER — SODIUM,POTASSIUM PHOSPHATES 278-250MG
1 POWDER IN PACKET (EA) ORAL ONCE
Refills: 0 | Status: COMPLETED | OUTPATIENT
Start: 2024-03-14 | End: 2024-03-14

## 2024-03-14 RX ORDER — OXYCODONE HYDROCHLORIDE 5 MG/1
1 TABLET ORAL
Qty: 3 | Refills: 0
Start: 2024-03-14 | End: 2024-03-14

## 2024-03-14 RX ORDER — APIXABAN 2.5 MG/1
1 TABLET, FILM COATED ORAL
Qty: 60 | Refills: 0
Start: 2024-03-14 | End: 2024-04-12

## 2024-03-14 RX ORDER — ACETAMINOPHEN 500 MG
2 TABLET ORAL
Qty: 0 | Refills: 0 | DISCHARGE
Start: 2024-03-14

## 2024-03-14 RX ADMIN — Medication 1000 MILLIGRAM(S): at 05:03

## 2024-03-14 RX ADMIN — MAGNESIUM OXIDE 400 MG ORAL TABLET 1000 MILLIGRAM(S): 241.3 TABLET ORAL at 09:15

## 2024-03-14 RX ADMIN — AMLODIPINE BESYLATE 10 MILLIGRAM(S): 2.5 TABLET ORAL at 05:03

## 2024-03-14 RX ADMIN — Medication 1 PACKET(S): at 11:38

## 2024-03-14 RX ADMIN — HEPARIN SODIUM 5000 UNIT(S): 5000 INJECTION INTRAVENOUS; SUBCUTANEOUS at 05:03

## 2024-03-14 RX ADMIN — Medication 1000 MILLIGRAM(S): at 05:33

## 2024-03-14 NOTE — DISCHARGE NOTE PROVIDER - NSDCCPTREATMENT_GEN_ALL_CORE_FT
PRINCIPAL PROCEDURE  Procedure: Resection, colon, anterior  Findings and Treatment: disease of intestine

## 2024-03-14 NOTE — DISCHARGE NOTE NURSING/CASE MANAGEMENT/SOCIAL WORK - PATIENT PORTAL LINK FT
You can access the FollowMyHealth Patient Portal offered by [facility] by registering at the following website: [email or web address]. By joining [facility]’s FollowMyHealth portal, you will also be able to view your health information using other applications (apps) compatible with our system.

## 2024-03-14 NOTE — DISCHARGE NOTE PROVIDER - NSDCCPCAREPLAN_GEN_ALL_CORE_FT
PRINCIPAL DISCHARGE DIAGNOSIS  Diagnosis: Other specified diseases of intestine  Assessment and Plan of Treatment: WOUND CARE: Keep incisions clean and dry.  Follow-up in office for staple removal.  BATHING: Please do not submerge wound underwater. You may shower and/or sponge bathe.  ACTIVITY: No heavy lifting or straining. Otherwise, you may return to your usual level of physical activity. If you are taking narcotic pain medication (such as Percocet), do NOT drive a car, operate machinery or make important decisions.  DIET: Low fiber diet  NOTIFY YOUR SURGEON IF: You have any bleeding that does not stop, any pus draining from your wound, any fever (over 100.4 F) or chills, persistent nausea/vomiting, persistent diarrhea, or if your pain is not controlled on your discharge pain medications.  FOLLOW-UP:  1. Follow-up with Dr. Rose within 1-2 weeks of discharge.  Please call office for appointment  2. Please follow up with your primary care physician in one week regarding your hospitalization.     PRINCIPAL DISCHARGE DIAGNOSIS  Diagnosis: Other specified diseases of intestine  Assessment and Plan of Treatment: WOUND CARE: Keep incisions clean and dry.  Follow-up in office for staple removal.  BATHING: Please do not submerge wound underwater. You may shower and/or sponge bathe.  ACTIVITY: No heavy lifting or straining. Otherwise, you may return to your usual level of physical activity. If you are taking narcotic pain medication (such as Percocet), do NOT drive a car, operate machinery or make important decisions.  DIET: Low fiber diet  NOTIFY YOUR SURGEON IF: You have any bleeding that does not stop, any pus draining from your wound, any fever (over 100.4 F) or chills, persistent nausea/vomiting, persistent diarrhea, or if your pain is not controlled on your discharge pain medications.  FOLLOW-UP:  1. Follow-up with Dr. Rose within 1-2 weeks of discharge.  Please call office for appointment  2. Please follow up with your primary care physician in one week regarding your hospitalization.  Based on your calculated Caprini Score, it was determined  you are at a higher risk to develop a deep vein thrombosis (DVT) or a pulmonary embolus (PE) post operatively.  You are encouraged to ambulate regulary at home and start the anticoagulation medication Eliquis.  Please take as prescribed. The prescription has been sent to [facility] at [facility].  Please call your Primary Care physician or Surgeon and return to ER  if you have any swelling and/or pain of your extremities, feel short of breath or having any difficulty breathing.

## 2024-03-14 NOTE — DISCHARGE NOTE PROVIDER - NSDCMRMEDTOKEN_GEN_ALL_CORE_FT
amLODIPine 10 mg oral tablet: 1 tab(s) orally once a day  aspirin 325 mg oral tablet: 1 tab(s) orally once a day  atenolol 25 mg oral tablet: 1 tab(s) orally once a day (at bedtime)  atorvastatin 20 mg oral tablet: 1 tab(s) orally once a day  ezetimibe 10 mg oral tablet: 1 tab(s) orally once a day (at bedtime)  lisinopril 40 mg oral tablet: 1 tab(s) orally once a day  methIMAzole 5 mg oral tablet: 1 tab(s) orally once a day  vitamin D  1000 IU daily:    acetaminophen 500 mg oral tablet: 2 tab(s) orally every 6 hours  amLODIPine 10 mg oral tablet: 1 tab(s) orally once a day  aspirin 325 mg oral tablet: 1 tab(s) orally once a day  atenolol 25 mg oral tablet: 1 tab(s) orally once a day (at bedtime)  atorvastatin 20 mg oral tablet: 1 tab(s) orally once a day  Eliquis 2.5 mg oral tablet: 1 tab(s) orally 2 times a day for 30 days  ezetimibe 10 mg oral tablet: 1 tab(s) orally once a day (at bedtime)  lisinopril 40 mg oral tablet: 1 tab(s) orally once a day  methIMAzole 5 mg oral tablet: 1 tab(s) orally once a day  vitamin D  1000 IU daily:    acetaminophen 500 mg oral tablet: 2 tab(s) orally every 6 hours  amLODIPine 10 mg oral tablet: 1 tab(s) orally once a day  aspirin 325 mg oral tablet: 1 tab(s) orally once a day  atenolol 25 mg oral tablet: 1 tab(s) orally once a day (at bedtime)  atorvastatin 20 mg oral tablet: 1 tab(s) orally once a day  Eliquis 2.5 mg oral tablet: 1 tab(s) orally 2 times a day for 30 days  ezetimibe 10 mg oral tablet: 1 tab(s) orally once a day (at bedtime)  lisinopril 40 mg oral tablet: 1 tab(s) orally once a day  methIMAzole 5 mg oral tablet: 1 tab(s) orally once a day  oxyCODONE 5 mg oral tablet: 1 tab(s) orally every 6 hours as needed for Severe Pain (7 - 10) MDD: 4  vitamin D  1000 IU daily:

## 2024-03-14 NOTE — PROGRESS NOTE ADULT - SUBJECTIVE AND OBJECTIVE BOX
RED SURGERY    Pt seen and examined. Pt denies any overnight events. Having BMs- no blood in stool. Denies N/V    ICU Vital Signs Last 24 Hrs  T(C): 36.4 (14 Mar 2024 08:58), Max: 36.8 (13 Mar 2024 16:08)  T(F): 97.6 (14 Mar 2024 08:58), Max: 98.2 (13 Mar 2024 16:08)  HR: 64 (14 Mar 2024 08:58) (62 - 71)  BP: 137/76 (14 Mar 2024 08:58) (117/63 - 137/76)  BP(mean): --  ABP: --  ABP(mean): --  RR: 18 (14 Mar 2024 08:58) (18 - 18)  SpO2: 96% (14 Mar 2024 08:58) (95% - 99%)      I&O's Detail    13 Mar 2024 07:01  -  14 Mar 2024 07:00  --------------------------------------------------------  IN:    Lactated Ringers: 360 mL    Oral Fluid: 250 mL  Total IN: 610 mL    OUT:    Indwelling Catheter - Urethral (mL): 300 mL    Voided (mL): 2300 mL  Total OUT: 2600 mL    Total NET: -1990 mL      14 Mar 2024 07:01  -  14 Mar 2024 10:51  --------------------------------------------------------  IN:    Oral Fluid: 240 mL  Total IN: 240 mL    OUT:    Voided (mL): 700 mL  Total OUT: 700 mL    Total NET: -460 mL    Physical exam: Pt sitting comfortably in bed in NAD  Chest- Symmetric chest rise  Abdomen- Soft, non-tender, non-distended, op-sites removed, staples c/d/i. Lester removed  LABS:                        13.5   12.33 )-----------( 150      ( 14 Mar 2024 07:36 )             39.1     03-14    140  |  108  |  19  ----------------------------<  115<H>  4.4   |  23  |  1.00    Ca    8.9      14 Mar 2024 07:35  Phos  1.8     03-14  Mg     2.4     03-14        Urinalysis Basic - ( 14 Mar 2024 07:35 )    Color: x / Appearance: x / SG: x / pH: x  Gluc: 115 mg/dL / Ketone: x  / Bili: x / Urobili: x   Blood: x / Protein: x / Nitrite: x   Leuk Esterase: x / RBC: x / WBC x   Sq Epi: x / Non Sq Epi: x / Bacteria: x    73 year old male with h/o previous MI s/p cardiac cath with stenting x 25 years ago (on aspirin, denies any CP), hypertension, thyroid nodule, hypercholesterolemia, BPH, renal calculus in the past s/p Lap, hand-assisted LAR ERP    -Continue low fiber diet  -Continue DVT Prophylaxis with SCD's & SQH  -Continue Incentive Spiromtery  -Continue analgesia  -Encourage OOB/ambulation with assistance  -Monitor bowel function  -Discharge home today, pain controlled, tolerating low fiber diet, having BMs  
Day 1 of Anesthesia Pain Management Service    SUBJECTIVE: Doing ok  Pain Scale Score:          [X] Refer to charted pain scores    THERAPY:    s/p    200 mcg PF morphine on 3\12\2024      MEDICATIONS  (STANDING):  acetaminophen   IVPB .. 1000 milliGRAM(s) IV Intermittent every 6 hours  amLODIPine   Tablet 10 milliGRAM(s) Oral daily  atenolol  Tablet 25 milliGRAM(s) Oral at bedtime  cefoTEtan  IVPB 2 Gram(s) IV Intermittent once  heparin   Injectable 5000 Unit(s) SubCutaneous every 8 hours  influenza  Vaccine (HIGH DOSE) 0.7 milliLiter(s) IntraMuscular once  lactated ringers. 1000 milliLiter(s) (40 mL/Hr) IV Continuous <Continuous>  methimazole 5 milliGRAM(s) Oral daily  morphine PF Spinal 0.2 milliGRAM(s) IntraThecal. once    MEDICATIONS  (PRN):  naloxone Injectable 0.1 milliGRAM(s) IV Push every 3 minutes PRN For ANY of the following changes in patient status:  A. RR LESS THAN 10 breaths per minute, B. Oxygen saturation LESS THAN 90%, C. Sedation score of 6  ondansetron Injectable 4 milliGRAM(s) IV Push every 6 hours PRN Nausea  oxyCODONE    IR 5 milliGRAM(s) Oral every 4 hours PRN Severe Pain (7 - 10)  oxyCODONE    IR 2.5 milliGRAM(s) Oral every 4 hours PRN Moderate Pain (4 - 6)      OBJECTIVE:    Sedation:        	[X] Alert	 [ ] Drowsy	[ ] Arousable      [ ] Asleep       [ ] Unresponsive    Side Effects:	[X] None 	[ ] Nausea	[ ] Vomiting         [ ] Pruritus  		[ ] Weakness            [ ] Numbness	          [ ] Other:    Vital Signs Last 24 Hrs  T(C): 36.5 (13 Mar 2024 08:19), Max: 37 (13 Mar 2024 05:27)  T(F): 97.7 (13 Mar 2024 08:19), Max: 98.6 (13 Mar 2024 05:27)  HR: 63 (13 Mar 2024 08:19) (55 - 68)  BP: 145/65 (13 Mar 2024 08:19) (86/59 - 145/65)  BP(mean): 79 (12 Mar 2024 21:15) (68 - 86)  RR: 18 (13 Mar 2024 08:19) (16 - 18)  SpO2: 95% (13 Mar 2024 08:19) (95% - 100%)    Parameters below as of 13 Mar 2024 08:19  Patient On (Oxygen Delivery Method): room air        ASSESSMENT/ PLAN  [X] Patient to be transitioned to prn analgesics after 24 hours  [X] Pain management per primary service, pain service to sign off   [X]Documentation and Verification of current medications
POST OP DAY  1     SUBJECTIVE:  I'm ok.    PAIN SCALE SCORE: [x] Refer to charted pain scores    THERAPY:  [ x ] Spinal morphine   [  ] Epidural morphine   [  ] IV PCA Hydromorphone 1 mg/ml    OBJECTIVE:  Comfortable Appearing    SEDATION SCORE:	  [ x ] Alert	    [  ] Drowsy        [  ] Arousable	[  ] Asleep	[  ] Unresponsive    Side Effects:	  [ x ] None	     [  ] Nausea        [  ] Pruritus        [  ] Weakness   [  ] Numbness        ASSESSMENT/ PLAN   [   ] Discontinue         [  ] Continue    [ x ] Change to prn Analgesics as per primary service.    DOCUMENTATION & VERIFICATION OF CURRENT MEDS [ x ] Done    COMMENTS: No Headache.  
SURGERY DAILY PROGRESS NOTE    SUBJECTIVE: Patient seen and examined on AM rounds. Pain controlled, tolerating clears, denies chest pain, SOB, fever, N/V. No new complaints.      OBJECTIVE:  Vital Signs Last 24 Hrs  T(C): 36.5 (13 Mar 2024 08:19), Max: 37 (13 Mar 2024 05:27)  T(F): 97.7 (13 Mar 2024 08:19), Max: 98.6 (13 Mar 2024 05:27)  HR: 63 (13 Mar 2024 08:19) (55 - 68)  BP: 145/65 (13 Mar 2024 08:19) (86/59 - 145/65)  BP(mean): 79 (12 Mar 2024 21:15) (68 - 86)  RR: 18 (13 Mar 2024 08:19) (16 - 18)  SpO2: 95% (13 Mar 2024 08:19) (95% - 100%)    Parameters below as of 13 Mar 2024 08:19  Patient On (Oxygen Delivery Method): room air        I&O's Summary    12 Mar 2024 07:01  -  13 Mar 2024 07:00  --------------------------------------------------------  IN: 760 mL / OUT: 495 mL / NET: 265 mL    13 Mar 2024 07:01  -  13 Mar 2024 12:04  --------------------------------------------------------  IN: 0 mL / OUT: 100 mL / NET: -100 mL        Physical Exam:  General Appearance: Appears well, NAD, A& O x 3  Chest: Nonlabored breathing on NC  CV: Hemodynamically stable  Abdomen: Soft, nontender, mildly distended. Dressings C/D/I  Extremities: Grossly symmetric    LABS:                        13.2   13.39 )-----------( 165      ( 13 Mar 2024 07:00 )             37.2     03-13    137  |  102  |  22  ----------------------------<  163<H>  3.7   |  21<L>  |  1.23    Ca    8.2<L>      13 Mar 2024 07:01  Phos  3.5     03-13  Mg     2.0     03-13        Urinalysis Basic - ( 13 Mar 2024 07:01 )    Color: x / Appearance: x / SG: x / pH: x  Gluc: 163 mg/dL / Ketone: x  / Bili: x / Urobili: x   Blood: x / Protein: x / Nitrite: x   Leuk Esterase: x / RBC: x / WBC x   Sq Epi: x / Non Sq Epi: x / Bacteria: x

## 2024-03-14 NOTE — DISCHARGE NOTE PROVIDER - HOSPITAL COURSE
73 year old male with h/o previous MI s/p cardiac cath with stenting x 25 years ago (on aspirin, denies any CP), hypertension, thyroid nodule, hypercholesterolemia, BPH, renal calculus in the past, presents to [facility] for scheduled laparoscopic lower anterior resection for disease of intestine. He denies any abdominal pain, no rectal bleeding, no N/V. Endorses having difficulty moving his bowel, prompted a colonoscopy, which revealed a large colon mass, now scheduled for surgery.  On 3/12 pt underwent s/p lap hand assisted anterior resection with primary anastomosis.  He tolerated the procedure well, was extubated and sent to PACU in stable condition. The patient was hemodynamically stable and was transferred to a surgical floor. The patient's pain was controlled by IV pain medications and then by PO pain medications. The patient was advanced from clears to low fiber diet and tolerated it well.  The patient is ambulating, voiding, tolerating a diet, and pain is controlled with oral pain medications.  Patient is stable for discharge home.    73 year old male with h/o previous MI s/p cardiac cath with stenting x 25 years ago (on aspirin, denies any CP), hypertension, thyroid nodule, hypercholesterolemia, BPH, renal calculus in the past, presents to [facility] for scheduled laparoscopic lower anterior resection for disease of intestine. He denies any abdominal pain, no rectal bleeding, no N/V. Endorses having difficulty moving his bowel, prompted a colonoscopy, which revealed a large colon mass, now scheduled for surgery.  On 3/12 pt underwent s/p lap hand assisted anterior resection with primary anastomosis.  He tolerated the procedure well, was extubated and sent to PACU in stable condition. The patient was hemodynamically stable and was transferred to a surgical floor. The patient's pain was controlled by IV pain medications and then by PO pain medications. The patient was advanced from clears to low fiber diet and tolerated it well.  The patient is ambulating, voiding, tolerating a diet, and pain is controlled with oral pain medications.  Patient is stable for discharge home.  Calculated Caprini score showed elevated risk for DVT, pt discharged home with 30 days of Eliquis for DVT ppx.

## 2024-03-14 NOTE — DISCHARGE NOTE PROVIDER - CARE PROVIDER_API CALL
Adalberto Rose  Colon/Rectal Surgery  [address] [place] [address], [address]  [place] [address]  Phone: [phone]  Fax: [phone]  Follow Up Time: 2 weeks

## 2024-03-14 NOTE — DISCHARGE NOTE PROVIDER - CARE PROVIDERS DIRECT ADDRESSES
[email or web address]

## 2024-03-15 ENCOUNTER — NON-APPOINTMENT (OUTPATIENT)
Age: 74
End: 2024-03-15

## 2024-03-15 PROBLEM — Z98.890 OTHER SPECIFIED POSTPROCEDURAL STATES: Chronic | Status: ACTIVE | Noted: 2024-03-08

## 2024-03-15 PROBLEM — I21.9 ACUTE MYOCARDIAL INFARCTION, UNSPECIFIED: Chronic | Status: ACTIVE | Noted: 2024-03-08

## 2024-03-15 PROBLEM — I10 ESSENTIAL (PRIMARY) HYPERTENSION: Chronic | Status: ACTIVE | Noted: 2024-03-08

## 2024-03-16 PROBLEM — Z87.438 PERSONAL HISTORY OF OTHER DISEASES OF MALE GENITAL ORGANS: Chronic | Status: ACTIVE | Noted: 2024-03-08

## 2024-03-16 PROBLEM — Z98.890 OTHER SPECIFIED POSTPROCEDURAL STATES: Chronic | Status: ACTIVE | Noted: 2024-03-08

## 2024-03-16 PROBLEM — K63.89 OTHER SPECIFIED DISEASES OF INTESTINE: Chronic | Status: ACTIVE | Noted: 2024-03-08

## 2024-03-16 PROBLEM — N20.0 CALCULUS OF KIDNEY: Chronic | Status: ACTIVE | Noted: 2024-03-08

## 2024-03-16 PROBLEM — Z86.39 PERSONAL HISTORY OF OTHER ENDOCRINE, NUTRITIONAL AND METABOLIC DISEASE: Chronic | Status: ACTIVE | Noted: 2024-03-08

## 2024-03-17 PROBLEM — E78.00 PURE HYPERCHOLESTEROLEMIA, UNSPECIFIED: Chronic | Status: ACTIVE | Noted: 2024-03-08

## 2024-03-20 ENCOUNTER — APPOINTMENT (OUTPATIENT)
Dept: COLORECTAL SURGERY | Facility: CLINIC | Age: 74
End: 2024-03-20
Payer: MEDICARE

## 2024-03-20 PROCEDURE — 99024 POSTOP FOLLOW-UP VISIT: CPT

## 2024-03-20 NOTE — HISTORY OF PRESENT ILLNESS
[FreeTextEntry1] : The patient is a very pleasant 73-year-old gentleman who presents for his first postoperative visit.  The patient is 8 days status post anterior resection for carcinoma of the left colon.  He had a rapid and uneventful postoperative recuperation and was discharged home on postoperative day #2.  His final pathology report is pending.  Patient looks and feels quite well.  He reports that his only discomfort is with coughing and sneezing.  He is not requiring pain medication.  He is tolerating small meals without nausea or vomiting.  He denies rectal bleeding or temperature elevation.  Physical examination reveals a soft, nontender, nondistended abdomen.  His trocar sites and specimen extraction site are fully healed with no evidence of wound infection.  His surgical staples were removed and replaced with Steri-Strips.  Patient was advised to start reintroducing fruits and vegetables into his diet.  I still want him to refrain from strenuous physical activity.  I will see him in 4 to 5 weeks for sigmoidoscopy under sedation.

## 2024-03-29 LAB — SURGICAL PATHOLOGY STUDY: SIGNIFICANT CHANGE UP

## 2024-04-24 RX ORDER — NEOMYCIN SULFATE 500 MG/1
500 TABLET ORAL
Qty: 6 | Refills: 0 | Status: DISCONTINUED | COMMUNITY
Start: 2024-02-28 | End: 2024-04-24

## 2024-04-24 RX ORDER — METRONIDAZOLE 500 MG/1
500 TABLET ORAL
Qty: 3 | Refills: 0 | Status: DISCONTINUED | COMMUNITY
Start: 2024-02-28 | End: 2024-04-24

## 2024-05-06 ENCOUNTER — APPOINTMENT (OUTPATIENT)
Dept: COLORECTAL SURGERY | Facility: CLINIC | Age: 74
End: 2024-05-06
Payer: MEDICARE

## 2024-05-06 PROCEDURE — 45330 DIAGNOSTIC SIGMOIDOSCOPY: CPT | Mod: 58

## 2024-05-14 ENCOUNTER — OUTPATIENT (OUTPATIENT)
Dept: OUTPATIENT SERVICES | Facility: HOSPITAL | Age: 74
LOS: 1 days | Discharge: HOME | End: 2024-05-14
Payer: MEDICARE

## 2024-05-14 DIAGNOSIS — C18.9 MALIGNANT NEOPLASM OF COLON, UNSPECIFIED: ICD-10-CM

## 2024-05-14 DIAGNOSIS — Z98.890 OTHER SPECIFIED POSTPROCEDURAL STATES: Chronic | ICD-10-CM

## 2024-05-21 ENCOUNTER — NON-APPOINTMENT (OUTPATIENT)
Age: 74
End: 2024-05-21

## 2024-05-21 ENCOUNTER — APPOINTMENT (OUTPATIENT)
Dept: HEMATOLOGY ONCOLOGY | Facility: CLINIC | Age: 74
End: 2024-05-21
Payer: MEDICARE

## 2024-05-21 VITALS
SYSTOLIC BLOOD PRESSURE: 137 MMHG | BODY MASS INDEX: 28.11 KG/M2 | HEIGHT: 67.13 IN | DIASTOLIC BLOOD PRESSURE: 76 MMHG | TEMPERATURE: 97.3 F | HEART RATE: 59 BPM | WEIGHT: 181.22 LBS | OXYGEN SATURATION: 100 % | RESPIRATION RATE: 14 BRPM

## 2024-05-21 PROCEDURE — 99205 OFFICE O/P NEW HI 60 MIN: CPT

## 2024-05-24 ENCOUNTER — RESULT REVIEW (OUTPATIENT)
Age: 74
End: 2024-05-24

## 2024-05-24 LAB — SURGICAL PATHOLOGY STUDY: SIGNIFICANT CHANGE UP

## 2024-05-24 PROCEDURE — 88321 CONSLTJ&REPRT SLD PREP ELSWR: CPT

## 2024-05-28 ENCOUNTER — RESULT REVIEW (OUTPATIENT)
Age: 74
End: 2024-05-28

## 2024-05-28 ENCOUNTER — APPOINTMENT (OUTPATIENT)
Dept: HEMATOLOGY ONCOLOGY | Facility: CLINIC | Age: 74
End: 2024-05-28

## 2024-05-28 LAB
BASOPHILS # BLD AUTO: 0.06 K/UL — SIGNIFICANT CHANGE UP (ref 0–0.2)
BASOPHILS NFR BLD AUTO: 0.6 % — SIGNIFICANT CHANGE UP (ref 0–2)
EOSINOPHIL # BLD AUTO: 0.49 K/UL — SIGNIFICANT CHANGE UP (ref 0–0.5)
EOSINOPHIL NFR BLD AUTO: 4.6 % — SIGNIFICANT CHANGE UP (ref 0–6)
HCT VFR BLD CALC: 40.9 % — SIGNIFICANT CHANGE UP (ref 39–50)
HGB BLD-MCNC: 14 G/DL — SIGNIFICANT CHANGE UP (ref 13–17)
IMM GRANULOCYTES NFR BLD AUTO: 0.6 % — SIGNIFICANT CHANGE UP (ref 0–0.9)
LYMPHOCYTES # BLD AUTO: 2.93 K/UL — SIGNIFICANT CHANGE UP (ref 1–3.3)
LYMPHOCYTES # BLD AUTO: 27.4 % — SIGNIFICANT CHANGE UP (ref 13–44)
MCHC RBC-ENTMCNC: 27.4 PG — SIGNIFICANT CHANGE UP (ref 27–34)
MCHC RBC-ENTMCNC: 34.2 G/DL — SIGNIFICANT CHANGE UP (ref 32–36)
MCV RBC AUTO: 80 FL — SIGNIFICANT CHANGE UP (ref 80–100)
MONOCYTES # BLD AUTO: 0.77 K/UL — SIGNIFICANT CHANGE UP (ref 0–0.9)
MONOCYTES NFR BLD AUTO: 7.2 % — SIGNIFICANT CHANGE UP (ref 2–14)
NEUTROPHILS # BLD AUTO: 6.4 K/UL — SIGNIFICANT CHANGE UP (ref 1.8–7.4)
NEUTROPHILS NFR BLD AUTO: 59.6 % — SIGNIFICANT CHANGE UP (ref 43–77)
NRBC # BLD: 0 /100 WBCS — SIGNIFICANT CHANGE UP (ref 0–0)
PLATELET # BLD AUTO: 169 K/UL — SIGNIFICANT CHANGE UP (ref 150–400)
RBC # BLD: 5.11 M/UL — SIGNIFICANT CHANGE UP (ref 4.2–5.8)
RBC # FLD: 13.4 % — SIGNIFICANT CHANGE UP (ref 10.3–14.5)
WBC # BLD: 10.71 K/UL — HIGH (ref 3.8–10.5)
WBC # FLD AUTO: 10.71 K/UL — HIGH (ref 3.8–10.5)

## 2024-05-28 NOTE — HISTORY OF PRESENT ILLNESS
[Disease: _____________________] : Disease: [unfilled] [T: ___] : T[unfilled] [N: ___] : N[unfilled] [M: ___] : M[unfilled] [AJCC Stage: ____] : AJCC Stage: [unfilled] [de-identified] : 73 years old male with PMHX of Hypercholesterolemia, Hypertension, CAD, S/p PCI x 1, Thyroid nodule comes for evaluation of newly diagnosed left sided Colon Cancer.   The patient history dates back 5 months ago when he noted the onset of constipation and intermittent rectal bleeding.  He also noted a 12 pound weight loss.  Because of the constipation he was referred for colonoscopy.  This was performed by Dr. Farrar on February 22, 2024 which revealed a mass in the descending colon which on biopsy revealed fragments of invasive adenocarcinoma with mucinous features and tubulovillous adenoma.  MMR testing was intact.  On February 26 the patient underwent CAT scan of the chest abdomen pelvis which revealed thyroid nodules, extensive coronary arterial calcifications a 2 cm cyst in the right hepatic lobe and punctate calcifications in the pancreas suggesting chronic pancreatitis.  There was also adrenal nodules measuring 1.8 cm and 2.7 cm and MRI was recommended.  There was also a 1.4 cm lesion in the right kidney.  The prostate was enlarged and there was a left inguinal hernia noted.  There was no colonic mass noted on CAT scan.  On February 28, 2024 the patient underwent surgical oncology evaluation and was recommended that he was a candidate for laparoscopic assisted left colectomy.  The CEA was 0.8.  The surgery was performed on March 12, 2024.  The pathology revealed left colon and rectosigmoid resection for a moderately differentiated adenocarcinoma measuring 4.5 cm.  The tumor was T3 N0 stage II.  There was small vessel lymphatic vascular invasion present along with perineural invasion.  Tumor deposits were absent.  The margins were negative and overall there was 28 lymph nodes negative for metastases.  The MMR was proficient.  The patient now returns to discuss further management.  02/22/2024, Colonoscopy: A. Colon, Transverse, Polyp, Biopsy-Tubular adenoma. No high-grade dysplasia seen.. B. Colon, Ascending, Polyp, Biopsy- Tubulovillous adenoma. No high-grade dysplasia seen.  C. Colon, Descending Biopsy- Detached fragments of invasive adenocarcinoma with mucinous features and tubulovillous adenoma.  Immunohistochemistry staining for the tested DNA mismatch repair protein was as follows: MLH1: Staining present in tumor. MSH2: Staining present in tumor. MSH6: Staining present in tumor. Conclusion: Immunohistochemical staining for the tested DNA mismatch repair protein is retained in the tumor.   02/26/2024, CT-Scan of Chest/Abdomen/pelvis: Enlarged thyroid gland with small nodules in the left gland. This may be further evaluated with a thyroid ultrasound. Pancreatic calcifications suggesting chronic pancreatitis. Bilateral adrenal nodules incompletely evaluated on this study. These may be further evaluated with a CT or MRI with adrenal protocol. Hyperdense lesion un the upper pole of right kidney. Further evaluation with a CT urogram recommended. Thick-walled urinary bladder with a right sided diverticulum. Markedly enlarged prostate gland. Fat-containing left inguinal hernia.   03/12/2024, Surgical Resection report was a follow:  1.  Left colon and rectosigmoid, resection- Moderately differentiated adenocarcinoma (4.5cm).  Resection margins negative for tumor. Pathological stage classification (pTNM, AJCC 8th Ed): pT3N0. See Synoptic Summary. See MMR Synoptic Summary.  2.  Proximal donut, resection-   Unremarkable segment of colon.  3.  Distal donut, resection- Segment of the colon with mucosal and submucosal congestion.  Synoptic Summary 1: Colon and Rectum - Resection. Specimen. Procedure: Low anterior resection. Macroscopic Evaluation of Mesorectum: Complete. Tumor, Tumor Site: Descending colon. Histologic Type:  Adenocarcinoma. Histologic Grade: G2, moderately differentiated. Tumor Size:  4.5 x 3.3 x 0.8 Centimeters (cm). Tumor Extent: Invades through muscularis propria into the pericolonic or perirectal tissue. Macroscopic Tumor Perforation: Not identified. Lymphatic and / or Vascular Invasion:  Small vessel. Perineural Invasion: Present. Tumor Budding Score: High (10 or more). Number of Tumor Buds: 12 per 'hotspot' field. Type of Polyp in which Invasive Carcinoma Arose:  None identified. Treatment Effect: No known presurgical therapy. Margins, Margin Status for Invasive Carcinoma:  All margins negative for invasive carcinoma. Closest Margin(s) to Invasive Carcinoma:  Radial (circumferential) - Retroperitoneal. Distance from Invasive Carcinoma to Closest Margin:  2.0 cm. Margin Status for Non-Invasive Tumor:  All margins negative. for high-grade dysplasia / intramucosal carcinoma and low-grade dysplasia. Regional Lymph Nodes, Regional Lymph Node Status: All regional lymph nodes negative for tumor. Number of Lymph Nodes Examined: 23. Tumor Deposits: Not identified. pTNM Classification (AJCC 8th Edition) pT Category: pT3. pN Category: pN0.  1: DNA Mismatch Repair Biomarker. DNA Mismatch Repair Testing. Specimen Site: Descending colon. Immunohistochemistry (IHC) Results for Mismatch Repair (MMR) Proteins:  Background non-neoplastic tissue / internal control shows intact nuclear expression. MLH1 Result:   Intact nuclear expression. MSH2 Result: Intact nuclear expression. MSH6 Result: Intact nuclear expression. PMS2 Result: Intact nuclear expression. Mismatch Repair (MMR) Interpretation:  No loss of nuclear expression of MMR proteins: No evidence of deficient mismatch repair (low probability of MSI-H).   [de-identified] : 3/12/24- Invasive mod diff  adenocarcinoma with mucinous features and tubulovillous adenoma.  [de-identified] : 3/12/24- Mod diff adenoca, 4.5 cm, MMR-P, margins neg, LVI small vessel pos, PNI pos, TD- neg, high budding, 0/28 nodes - high risk stage II colon ca left sided. Discussed Folfox and Xeloda Matuncle - colon c age 78

## 2024-05-28 NOTE — CONSULT LETTER
[Dear  ___] : Dear  [unfilled], [Consult Letter:] : I had the pleasure of evaluating your patient, [unfilled]. [Please see my note below.] : Please see my note below. [Consult Closing:] : Thank you very much for allowing me to participate in the care of this patient.  If you have any questions, please do not hesitate to contact me. [Sincerely,] : Sincerely, [DrClarisse  ___] : Dr. ADKINS [DrClarisse ___] : Dr. ADKINS [FreeTextEntry2] : Dr. Adalberto Rose [FreeTextEntry3] : [facility]

## 2024-05-28 NOTE — ASSESSMENT
[Curative] : Goals of care discussed with patient: Curative [FreeTextEntry1] : A discussion took place with the patient and his wife regarding further management.  The patient's clinical findings are consistent with high risk stage II colon carcinoma status post resection and he is doing well postoperatively.  We explained that 80% of patients with stage II colon cancer will do well without further treatment.  If we identify high risk stage II patients we do discuss preventative adjuvant chemotherapy.  We discussed the options including oral therapy with capecitabine versus IV infusion with oxaliplatin 5-FU leucovorin, FOLFOX treatment.  It was explained that the additional benefits of the IV treatment would add only 5 to 10% benefit over the oral therapy.  As a result we are recommending capecitabine treatment which should be tolerated based on his comorbid medical conditions.  The side effects were discussed in detail and informed consent was obtained.  The side effects include nausea, vomiting, sandro blood counts and life-threatening infection, hair loss, memory change, tearing of the eyes, mouth sores, fatigue, diarrhea, neuropathy, change in taste, hand-foot irritation, possible allergic reactions, elevated liver enzymes, chest pain and cardiac symptoms  and other side effects.  The plan would be to administer treatment twice a day after breakfast and after dinner 14 days on 7 days off for 3 weeks cycle and we would recommend a total of 8 cycles over 6 months as treatment.  We also offered nutrition counseling with the patient should he have questions regarding food intake in diet while he is on treatment.  Will be recommending that he continue physical activity including walking to maintain his strength.  The patient will continue his medical, cardiac follow-up.  We also discussed follow-up for the adrenal nodules noted on CAT scan and he is also been seen by endocrinology for his thyroid nodules and previous biopsies were benign.  Also the patient denies previous knowledge of pancreatitis despite the presence of calcifications in the pancreas.  We did discuss doing an MRI to evaluate further the adrenal nodules and a probable cyst in the right kidney.

## 2024-05-28 NOTE — PHYSICAL EXAM
[Restricted in physically strenuous activity but ambulatory and able to carry out work of a light or sedentary nature] : Status 1- Restricted in physically strenuous activity but ambulatory and able to carry out work of a light or sedentary nature, e.g., light house work, office work [Normal] : affect appropriate [de-identified] : healed lap incision

## 2024-05-28 NOTE — REVIEW OF SYSTEMS
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 [FreeTextEntry8] : urinary frequency, hx of kidney stone, hx of testicular surgery [FreeTextEntry9] : has trigger finger and neck pain

## 2024-05-29 LAB
ALBUMIN SERPL ELPH-MCNC: 4.5 G/DL
ALP BLD-CCNC: 108 U/L
ALT SERPL-CCNC: 18 U/L
ANION GAP SERPL CALC-SCNC: 12 MMOL/L
APTT BLD: 32.9 SEC
AST SERPL-CCNC: 20 U/L
BILIRUB SERPL-MCNC: 0.4 MG/DL
BUN SERPL-MCNC: 16 MG/DL
CALCIUM SERPL-MCNC: 9.4 MG/DL
CEA SERPL-MCNC: 0.9 NG/ML
CHLORIDE SERPL-SCNC: 104 MMOL/L
CO2 SERPL-SCNC: 26 MMOL/L
CREAT SERPL-MCNC: 1.03 MG/DL
EGFR: 76 ML/MIN/1.73M2
GLUCOSE SERPL-MCNC: 87 MG/DL
HBV CORE IGG+IGM SER QL: NONREACTIVE
HBV CORE IGM SER QL: NONREACTIVE
HBV SURFACE AB SER QL: NONREACTIVE
HBV SURFACE AG SER QL: NONREACTIVE
HCV AB SER QL: NONREACTIVE
HCV S/CO RATIO: 0.1 S/CO
INR PPP: 0.95 RATIO
POTASSIUM SERPL-SCNC: 4.4 MMOL/L
PROT SERPL-MCNC: 6.5 G/DL
PT BLD: 10.7 SEC
SODIUM SERPL-SCNC: 142 MMOL/L

## 2024-06-03 LAB
DPYD GENOTYPE: NORMAL
DPYD PHENOTYPE: NORMAL
DPYD SPECIMEN: NORMAL
PATHOLOGY STUDY: NORMAL

## 2024-06-05 LAB
FULL GENE SEQUENCE RESULT: NORMAL
INTERPRETATION PGDFRB: NORMAL
Lab: NEGATIVE
REF LAB TEST METHOD: NORMAL
REVIEWED BY: NORMAL
TA REPEAT RESULT: NORMAL
TEST PERFORMANCE INFO SPEC: NORMAL

## 2024-06-11 RX ORDER — POLYETHYLENE GLYCOL 3350 17 G
17 POWDER IN PACKET (EA) ORAL DAILY
Qty: 1 | Refills: 5 | Status: ACTIVE | COMMUNITY
Start: 2024-06-11 | End: 1900-01-01

## 2024-06-11 RX ORDER — DOCUSATE SODIUM 100 MG/1
100 CAPSULE ORAL TWICE DAILY
Qty: 90 | Refills: 5 | Status: ACTIVE | COMMUNITY
Start: 2024-06-11 | End: 1900-01-01

## 2024-06-12 LAB
BASOPHILS # BLD AUTO: 0.05 K/UL
BASOPHILS NFR BLD AUTO: 0.6 %
EOSINOPHIL # BLD AUTO: 0.31 K/UL
EOSINOPHIL NFR BLD AUTO: 3.5 %
HCT VFR BLD CALC: 40.4 %
HGB BLD-MCNC: 13.7 G/DL
IMM GRANULOCYTES NFR BLD AUTO: 0.3 %
LYMPHOCYTES # BLD AUTO: 2.37 K/UL
LYMPHOCYTES NFR BLD AUTO: 26.4 %
MAN DIFF?: NORMAL
MCHC RBC-ENTMCNC: 27.2 PG
MCHC RBC-ENTMCNC: 33.9 GM/DL
MCV RBC AUTO: 80.2 FL
MONOCYTES # BLD AUTO: 0.68 K/UL
MONOCYTES NFR BLD AUTO: 7.6 %
NEUTROPHILS # BLD AUTO: 5.54 K/UL
NEUTROPHILS NFR BLD AUTO: 61.6 %
PLATELET # BLD AUTO: 168 K/UL
RBC # BLD: 5.04 M/UL
RBC # FLD: 14.4 %
WBC # FLD AUTO: 8.98 K/UL

## 2024-06-17 ENCOUNTER — NON-APPOINTMENT (OUTPATIENT)
Age: 74
End: 2024-06-17

## 2024-06-24 LAB
MISCELLANEOUS TEST: NORMAL
PROC NAME: NORMAL

## 2024-06-28 ENCOUNTER — RESULT REVIEW (OUTPATIENT)
Age: 74
End: 2024-06-28

## 2024-06-28 ENCOUNTER — APPOINTMENT (OUTPATIENT)
Dept: HEMATOLOGY ONCOLOGY | Facility: CLINIC | Age: 74
End: 2024-06-28
Payer: MEDICARE

## 2024-06-28 VITALS
BODY MASS INDEX: 28.21 KG/M2 | OXYGEN SATURATION: 99 % | WEIGHT: 180.78 LBS | HEART RATE: 60 BPM | TEMPERATURE: 97.2 F | RESPIRATION RATE: 16 BRPM

## 2024-06-28 DIAGNOSIS — C18.6 MALIGNANT NEOPLASM OF DESCENDING COLON: ICD-10-CM

## 2024-06-28 LAB
BASOPHILS # BLD AUTO: 0.04 K/UL — SIGNIFICANT CHANGE UP (ref 0–0.2)
BASOPHILS NFR BLD AUTO: 0.4 % — SIGNIFICANT CHANGE UP (ref 0–2)
EOSINOPHIL # BLD AUTO: 0.42 K/UL — SIGNIFICANT CHANGE UP (ref 0–0.5)
EOSINOPHIL NFR BLD AUTO: 4.1 % — SIGNIFICANT CHANGE UP (ref 0–6)
HCT VFR BLD CALC: 41 % — SIGNIFICANT CHANGE UP (ref 39–50)
HGB BLD-MCNC: 14.3 G/DL — SIGNIFICANT CHANGE UP (ref 13–17)
IMM GRANULOCYTES NFR BLD AUTO: 0.4 % — SIGNIFICANT CHANGE UP (ref 0–0.9)
LYMPHOCYTES # BLD AUTO: 2.69 K/UL — SIGNIFICANT CHANGE UP (ref 1–3.3)
LYMPHOCYTES # BLD AUTO: 26.1 % — SIGNIFICANT CHANGE UP (ref 13–44)
MCHC RBC-ENTMCNC: 28.5 PG — SIGNIFICANT CHANGE UP (ref 27–34)
MCHC RBC-ENTMCNC: 34.9 G/DL — SIGNIFICANT CHANGE UP (ref 32–36)
MCV RBC AUTO: 81.7 FL — SIGNIFICANT CHANGE UP (ref 80–100)
MONOCYTES # BLD AUTO: 0.83 K/UL — SIGNIFICANT CHANGE UP (ref 0–0.9)
MONOCYTES NFR BLD AUTO: 8.1 % — SIGNIFICANT CHANGE UP (ref 2–14)
NEUTROPHILS # BLD AUTO: 6.29 K/UL — SIGNIFICANT CHANGE UP (ref 1.8–7.4)
NEUTROPHILS NFR BLD AUTO: 60.9 % — SIGNIFICANT CHANGE UP (ref 43–77)
NRBC # BLD: 0 /100 WBCS — SIGNIFICANT CHANGE UP (ref 0–0)
PLATELET # BLD AUTO: 160 K/UL — SIGNIFICANT CHANGE UP (ref 150–400)
RBC # BLD: 5.02 M/UL — SIGNIFICANT CHANGE UP (ref 4.2–5.8)
RBC # FLD: 16.3 % — HIGH (ref 10.3–14.5)
WBC # BLD: 10.31 K/UL — SIGNIFICANT CHANGE UP (ref 3.8–10.5)
WBC # FLD AUTO: 10.31 K/UL — SIGNIFICANT CHANGE UP (ref 3.8–10.5)

## 2024-06-28 PROCEDURE — 99213 OFFICE O/P EST LOW 20 MIN: CPT

## 2024-07-01 LAB
ALBUMIN SERPL ELPH-MCNC: 4.3 G/DL
ALP BLD-CCNC: 112 U/L
ALT SERPL-CCNC: 15 U/L
ANION GAP SERPL CALC-SCNC: 11 MMOL/L
AST SERPL-CCNC: 18 U/L
BILIRUB SERPL-MCNC: 0.5 MG/DL
BUN SERPL-MCNC: 12 MG/DL
CALCIUM SERPL-MCNC: 9.4 MG/DL
CEA SERPL-MCNC: 1 NG/ML
CHLORIDE SERPL-SCNC: 104 MMOL/L
CO2 SERPL-SCNC: 27 MMOL/L
CREAT SERPL-MCNC: 1.12 MG/DL
EGFR: 69 ML/MIN/1.73M2
GLUCOSE SERPL-MCNC: 96 MG/DL
POTASSIUM SERPL-SCNC: 4.4 MMOL/L
PROT SERPL-MCNC: 6.4 G/DL
SODIUM SERPL-SCNC: 142 MMOL/L

## 2024-07-16 ENCOUNTER — OUTPATIENT (OUTPATIENT)
Dept: OUTPATIENT SERVICES | Facility: HOSPITAL | Age: 74
LOS: 1 days | Discharge: HOME | End: 2024-07-16

## 2024-07-16 DIAGNOSIS — Z98.890 OTHER SPECIFIED POSTPROCEDURAL STATES: Chronic | ICD-10-CM

## 2024-07-16 DIAGNOSIS — C18.9 MALIGNANT NEOPLASM OF COLON, UNSPECIFIED: ICD-10-CM

## 2024-07-18 ENCOUNTER — NON-APPOINTMENT (OUTPATIENT)
Age: 74
End: 2024-07-18

## 2024-07-19 ENCOUNTER — APPOINTMENT (OUTPATIENT)
Dept: HEMATOLOGY ONCOLOGY | Facility: CLINIC | Age: 74
End: 2024-07-19
Payer: MEDICARE

## 2024-07-19 ENCOUNTER — RESULT REVIEW (OUTPATIENT)
Age: 74
End: 2024-07-19

## 2024-07-19 VITALS
RESPIRATION RATE: 16 BRPM | DIASTOLIC BLOOD PRESSURE: 68 MMHG | OXYGEN SATURATION: 98 % | HEART RATE: 56 BPM | TEMPERATURE: 97.3 F | SYSTOLIC BLOOD PRESSURE: 119 MMHG | WEIGHT: 187.17 LBS | BODY MASS INDEX: 29.21 KG/M2

## 2024-07-19 DIAGNOSIS — Z79.899 OTHER LONG TERM (CURRENT) DRUG THERAPY: ICD-10-CM

## 2024-07-19 DIAGNOSIS — C18.6 MALIGNANT NEOPLASM OF DESCENDING COLON: ICD-10-CM

## 2024-07-19 LAB
BASOPHILS # BLD AUTO: 0.03 K/UL — SIGNIFICANT CHANGE UP (ref 0–0.2)
BASOPHILS NFR BLD AUTO: 0.3 % — SIGNIFICANT CHANGE UP (ref 0–2)
CEA SERPL-MCNC: 1.1 NG/ML
EOSINOPHIL # BLD AUTO: 0.4 K/UL — SIGNIFICANT CHANGE UP (ref 0–0.5)
EOSINOPHIL NFR BLD AUTO: 4.4 % — SIGNIFICANT CHANGE UP (ref 0–6)
HCT VFR BLD CALC: 40.1 % — SIGNIFICANT CHANGE UP (ref 39–50)
HGB BLD-MCNC: 14.1 G/DL — SIGNIFICANT CHANGE UP (ref 13–17)
IMM GRANULOCYTES NFR BLD AUTO: 0.6 % — SIGNIFICANT CHANGE UP (ref 0–0.9)
LYMPHOCYTES # BLD AUTO: 2.68 K/UL — SIGNIFICANT CHANGE UP (ref 1–3.3)
LYMPHOCYTES # BLD AUTO: 29.5 % — SIGNIFICANT CHANGE UP (ref 13–44)
MCHC RBC-ENTMCNC: 29.1 PG — SIGNIFICANT CHANGE UP (ref 27–34)
MCHC RBC-ENTMCNC: 35.2 G/DL — SIGNIFICANT CHANGE UP (ref 32–36)
MCV RBC AUTO: 82.9 FL — SIGNIFICANT CHANGE UP (ref 80–100)
MONOCYTES # BLD AUTO: 0.7 K/UL — SIGNIFICANT CHANGE UP (ref 0–0.9)
MONOCYTES NFR BLD AUTO: 7.7 % — SIGNIFICANT CHANGE UP (ref 2–14)
NEUTROPHILS # BLD AUTO: 5.23 K/UL — SIGNIFICANT CHANGE UP (ref 1.8–7.4)
NEUTROPHILS NFR BLD AUTO: 57.5 % — SIGNIFICANT CHANGE UP (ref 43–77)
NRBC # BLD: 0 /100 WBCS — SIGNIFICANT CHANGE UP (ref 0–0)
PLATELET # BLD AUTO: 146 K/UL — LOW (ref 150–400)
RBC # BLD: 4.84 M/UL — SIGNIFICANT CHANGE UP (ref 4.2–5.8)
RBC # FLD: 19 % — HIGH (ref 10.3–14.5)
WBC # BLD: 9.09 K/UL — SIGNIFICANT CHANGE UP (ref 3.8–10.5)
WBC # FLD AUTO: 9.09 K/UL — SIGNIFICANT CHANGE UP (ref 3.8–10.5)

## 2024-07-19 PROCEDURE — 99214 OFFICE O/P EST MOD 30 MIN: CPT

## 2024-07-21 LAB
ALBUMIN SERPL ELPH-MCNC: 4.3 G/DL
ALP BLD-CCNC: 114 U/L
ALT SERPL-CCNC: 17 U/L
ANION GAP SERPL CALC-SCNC: 12 MMOL/L
AST SERPL-CCNC: 21 U/L
BILIRUB SERPL-MCNC: 0.6 MG/DL
BUN SERPL-MCNC: 15 MG/DL
CALCIUM SERPL-MCNC: 9 MG/DL
CHLORIDE SERPL-SCNC: 103 MMOL/L
CO2 SERPL-SCNC: 24 MMOL/L
CREAT SERPL-MCNC: 1.1 MG/DL
EGFR: 70 ML/MIN/1.73M2
GLUCOSE SERPL-MCNC: 104 MG/DL
POTASSIUM SERPL-SCNC: 4.2 MMOL/L
PROT SERPL-MCNC: 6.1 G/DL
SODIUM SERPL-SCNC: 139 MMOL/L

## 2024-08-09 ENCOUNTER — RESULT REVIEW (OUTPATIENT)
Age: 74
End: 2024-08-09

## 2024-08-09 ENCOUNTER — APPOINTMENT (OUTPATIENT)
Dept: HEMATOLOGY ONCOLOGY | Facility: CLINIC | Age: 74
End: 2024-08-09

## 2024-08-09 LAB
BASOPHILS # BLD AUTO: 0.05 K/UL — SIGNIFICANT CHANGE UP (ref 0–0.2)
BASOPHILS NFR BLD AUTO: 0.6 % — SIGNIFICANT CHANGE UP (ref 0–2)
EOSINOPHIL # BLD AUTO: 0.46 K/UL — SIGNIFICANT CHANGE UP (ref 0–0.5)
EOSINOPHIL NFR BLD AUTO: 5.2 % — SIGNIFICANT CHANGE UP (ref 0–6)
HCT VFR BLD CALC: 39 % — SIGNIFICANT CHANGE UP (ref 39–50)
HGB BLD-MCNC: 13.9 G/DL — SIGNIFICANT CHANGE UP (ref 13–17)
IMM GRANULOCYTES NFR BLD AUTO: 1 % — HIGH (ref 0–0.9)
LYMPHOCYTES # BLD AUTO: 2.72 K/UL — SIGNIFICANT CHANGE UP (ref 1–3.3)
LYMPHOCYTES # BLD AUTO: 30.8 % — SIGNIFICANT CHANGE UP (ref 13–44)
MCHC RBC-ENTMCNC: 30.5 PG — SIGNIFICANT CHANGE UP (ref 27–34)
MCHC RBC-ENTMCNC: 35.6 G/DL — SIGNIFICANT CHANGE UP (ref 32–36)
MCV RBC AUTO: 85.7 FL — SIGNIFICANT CHANGE UP (ref 80–100)
MONOCYTES # BLD AUTO: 0.74 K/UL — SIGNIFICANT CHANGE UP (ref 0–0.9)
MONOCYTES NFR BLD AUTO: 8.4 % — SIGNIFICANT CHANGE UP (ref 2–14)
NEUTROPHILS # BLD AUTO: 4.77 K/UL — SIGNIFICANT CHANGE UP (ref 1.8–7.4)
NEUTROPHILS NFR BLD AUTO: 54 % — SIGNIFICANT CHANGE UP (ref 43–77)
NRBC # BLD: 0 /100 WBCS — SIGNIFICANT CHANGE UP (ref 0–0)
PLATELET # BLD AUTO: 146 K/UL — LOW (ref 150–400)
RBC # BLD: 4.55 M/UL — SIGNIFICANT CHANGE UP (ref 4.2–5.8)
RBC # FLD: 20.5 % — HIGH (ref 10.3–14.5)
WBC # BLD: 8.83 K/UL — SIGNIFICANT CHANGE UP (ref 3.8–10.5)
WBC # FLD AUTO: 8.83 K/UL — SIGNIFICANT CHANGE UP (ref 3.8–10.5)

## 2024-08-09 PROCEDURE — 99214 OFFICE O/P EST MOD 30 MIN: CPT

## 2024-08-09 NOTE — REVIEW OF SYSTEMS
[Constipation] : constipation [Diarrhea: Grade 0] : Diarrhea: Grade 0 [Negative] : Allergic/Immunologic

## 2024-08-12 NOTE — ASSESSMENT
[Curative] : Goals of care discussed with patient: Curative [FreeTextEntry1] : S/p C3 Capecitabine 500 mg: 3 tabs in am/3 tabs in pm x 14 days on/7 days off. He will continue present dose without changes. He is tolerating medication well.  The patient will continue the treatment for 8 cycles, over 6 months. We will continue to monitor the patient for side effects and toxicities. I ordered repeat blood tests will tumor markers, f/u. RTO in 3 weeks.

## 2024-08-12 NOTE — HISTORY OF PRESENT ILLNESS
[Disease: _____________________] : Disease: [unfilled] [T: ___] : T[unfilled] [N: ___] : N[unfilled] [M: ___] : M[unfilled] [AJCC Stage: ____] : AJCC Stage: [unfilled] [de-identified] : 73 years old male with PMHX of Hypercholesterolemia, Hypertension, CAD, S/p PCI x 1, Thyroid nodule comes for evaluation of newly diagnosed left sided Colon Cancer.   The patient history dates back 5 months ago when he noted the onset of constipation and intermittent rectal bleeding.  He also noted a 12 pound weight loss.  Because of the constipation he was referred for colonoscopy.  This was performed by Dr. Farrar on February 22, 2024 which revealed a mass in the descending colon which on biopsy revealed fragments of invasive adenocarcinoma with mucinous features and tubulovillous adenoma.  MMR testing was intact.  On February 26 the patient underwent CAT scan of the chest abdomen pelvis which revealed thyroid nodules, extensive coronary arterial calcifications a 2 cm cyst in the right hepatic lobe and punctate calcifications in the pancreas suggesting chronic pancreatitis.  There was also adrenal nodules measuring 1.8 cm and 2.7 cm and MRI was recommended.  There was also a 1.4 cm lesion in the right kidney.  The prostate was enlarged and there was a left inguinal hernia noted.  There was no colonic mass noted on CAT scan.  On February 28, 2024 the patient underwent surgical oncology evaluation and was recommended that he was a candidate for laparoscopic assisted left colectomy.  The CEA was 0.8.  The surgery was performed on March 12, 2024.  The pathology revealed left colon and rectosigmoid resection for a moderately differentiated adenocarcinoma measuring 4.5 cm.  The tumor was T3 N0 stage II.  There was small vessel lymphatic vascular invasion present along with perineural invasion.  Tumor deposits were absent.  The margins were negative and overall there was 28 lymph nodes negative for metastases.  The MMR was proficient.  The patient now returns to discuss further management.  02/22/2024, Colonoscopy: A. Colon, Transverse, Polyp, Biopsy-Tubular adenoma. No high-grade dysplasia seen.. B. Colon, Ascending, Polyp, Biopsy- Tubulovillous adenoma. No high-grade dysplasia seen.  C. Colon, Descending Biopsy- Detached fragments of invasive adenocarcinoma with mucinous features and tubulovillous adenoma.  Immunohistochemistry staining for the tested DNA mismatch repair protein was as follows: MLH1: Staining present in tumor. MSH2: Staining present in tumor. MSH6: Staining present in tumor. Conclusion: Immunohistochemical staining for the tested DNA mismatch repair protein is retained in the tumor.   02/26/2024, CT-Scan of Chest/Abdomen/pelvis: Enlarged thyroid gland with small nodules in the left gland. This may be further evaluated with a thyroid ultrasound. Pancreatic calcifications suggesting chronic pancreatitis. Bilateral adrenal nodules incompletely evaluated on this study. These may be further evaluated with a CT or MRI with adrenal protocol. Hyperdense lesion un the upper pole of right kidney. Further evaluation with a CT urogram recommended. Thick-walled urinary bladder with a right sided diverticulum. Markedly enlarged prostate gland. Fat-containing left inguinal hernia.   03/12/2024, Surgical Resection report was a follow:  1.  Left colon and rectosigmoid, resection- Moderately differentiated adenocarcinoma (4.5cm).  Resection margins negative for tumor. Pathological stage classification (pTNM, AJCC 8th Ed): pT3N0. See Synoptic Summary. See MMR Synoptic Summary.  2.  Proximal donut, resection-   Unremarkable segment of colon.  3.  Distal donut, resection- Segment of the colon with mucosal and submucosal congestion.  Synoptic Summary 1: Colon and Rectum - Resection. Specimen. Procedure: Low anterior resection. Macroscopic Evaluation of Mesorectum: Complete. Tumor, Tumor Site: Descending colon. Histologic Type:  Adenocarcinoma. Histologic Grade: G2, moderately differentiated. Tumor Size:  4.5 x 3.3 x 0.8 Centimeters (cm). Tumor Extent: Invades through muscularis propria into the pericolonic or perirectal tissue. Macroscopic Tumor Perforation: Not identified. Lymphatic and / or Vascular Invasion:  Small vessel. Perineural Invasion: Present. Tumor Budding Score: High (10 or more). Number of Tumor Buds: 12 per 'hotspot' field. Type of Polyp in which Invasive Carcinoma Arose:  None identified. Treatment Effect: No known presurgical therapy. Margins, Margin Status for Invasive Carcinoma:  All margins negative for invasive carcinoma. Closest Margin(s) to Invasive Carcinoma:  Radial (circumferential) - Retroperitoneal. Distance from Invasive Carcinoma to Closest Margin:  2.0 cm. Margin Status for Non-Invasive Tumor:  All margins negative. for high-grade dysplasia / intramucosal carcinoma and low-grade dysplasia. Regional Lymph Nodes, Regional Lymph Node Status: All regional lymph nodes negative for tumor. Number of Lymph Nodes Examined: 23. Tumor Deposits: Not identified. pTNM Classification (AJCC 8th Edition) pT Category: pT3. pN Category: pN0.  1: DNA Mismatch Repair Biomarker. DNA Mismatch Repair Testing. Specimen Site: Descending colon. Immunohistochemistry (IHC) Results for Mismatch Repair (MMR) Proteins:  Background non-neoplastic tissue / internal control shows intact nuclear expression. MLH1 Result:   Intact nuclear expression. MSH2 Result: Intact nuclear expression. MSH6 Result: Intact nuclear expression. PMS2 Result: Intact nuclear expression. Mismatch Repair (MMR) Interpretation:  No loss of nuclear expression of MMR proteins: No evidence of deficient mismatch repair (low probability of MSI-H).   [de-identified] : 3/12/24- Invasive mod diff  adenocarcinoma with mucinous features and tubulovillous adenoma.  [de-identified] : 3/12/24- Mod diff adenoca, 4.5 cm, MMR-P, margins neg, LVI small vessel pos, PNI pos, TD- neg, high budding, 0/28 nodes - high risk stage II colon ca left sided. Discussed Folfox and Xeloda Matuncle - colon c age 78 [de-identified] : S/p C3 capecitabine 3 pills in the morning and 3 pills in the evening 14 days on 7 days off.  He feels fine except increased fatigue. His appetite is okay. He has no diarrhea, constipation, pain, and no mouth sores.

## 2024-08-12 NOTE — HISTORY OF PRESENT ILLNESS
[Disease: _____________________] : Disease: [unfilled] [T: ___] : T[unfilled] [N: ___] : N[unfilled] [M: ___] : M[unfilled] [AJCC Stage: ____] : AJCC Stage: [unfilled] [de-identified] : 73 years old male with PMHX of Hypercholesterolemia, Hypertension, CAD, S/p PCI x 1, Thyroid nodule comes for evaluation of newly diagnosed left sided Colon Cancer.   The patient history dates back 5 months ago when he noted the onset of constipation and intermittent rectal bleeding.  He also noted a 12 pound weight loss.  Because of the constipation he was referred for colonoscopy.  This was performed by Dr. Farrar on February 22, 2024 which revealed a mass in the descending colon which on biopsy revealed fragments of invasive adenocarcinoma with mucinous features and tubulovillous adenoma.  MMR testing was intact.  On February 26 the patient underwent CAT scan of the chest abdomen pelvis which revealed thyroid nodules, extensive coronary arterial calcifications a 2 cm cyst in the right hepatic lobe and punctate calcifications in the pancreas suggesting chronic pancreatitis.  There was also adrenal nodules measuring 1.8 cm and 2.7 cm and MRI was recommended.  There was also a 1.4 cm lesion in the right kidney.  The prostate was enlarged and there was a left inguinal hernia noted.  There was no colonic mass noted on CAT scan.  On February 28, 2024 the patient underwent surgical oncology evaluation and was recommended that he was a candidate for laparoscopic assisted left colectomy.  The CEA was 0.8.  The surgery was performed on March 12, 2024.  The pathology revealed left colon and rectosigmoid resection for a moderately differentiated adenocarcinoma measuring 4.5 cm.  The tumor was T3 N0 stage II.  There was small vessel lymphatic vascular invasion present along with perineural invasion.  Tumor deposits were absent.  The margins were negative and overall there was 28 lymph nodes negative for metastases.  The MMR was proficient.  The patient now returns to discuss further management.  02/22/2024, Colonoscopy: A. Colon, Transverse, Polyp, Biopsy-Tubular adenoma. No high-grade dysplasia seen.. B. Colon, Ascending, Polyp, Biopsy- Tubulovillous adenoma. No high-grade dysplasia seen.  C. Colon, Descending Biopsy- Detached fragments of invasive adenocarcinoma with mucinous features and tubulovillous adenoma.  Immunohistochemistry staining for the tested DNA mismatch repair protein was as follows: MLH1: Staining present in tumor. MSH2: Staining present in tumor. MSH6: Staining present in tumor. Conclusion: Immunohistochemical staining for the tested DNA mismatch repair protein is retained in the tumor.   02/26/2024, CT-Scan of Chest/Abdomen/pelvis: Enlarged thyroid gland with small nodules in the left gland. This may be further evaluated with a thyroid ultrasound. Pancreatic calcifications suggesting chronic pancreatitis. Bilateral adrenal nodules incompletely evaluated on this study. These may be further evaluated with a CT or MRI with adrenal protocol. Hyperdense lesion un the upper pole of right kidney. Further evaluation with a CT urogram recommended. Thick-walled urinary bladder with a right sided diverticulum. Markedly enlarged prostate gland. Fat-containing left inguinal hernia.   03/12/2024, Surgical Resection report was a follow:  1.  Left colon and rectosigmoid, resection- Moderately differentiated adenocarcinoma (4.5cm).  Resection margins negative for tumor. Pathological stage classification (pTNM, AJCC 8th Ed): pT3N0. See Synoptic Summary. See MMR Synoptic Summary.  2.  Proximal donut, resection-   Unremarkable segment of colon.  3.  Distal donut, resection- Segment of the colon with mucosal and submucosal congestion.  Synoptic Summary 1: Colon and Rectum - Resection. Specimen. Procedure: Low anterior resection. Macroscopic Evaluation of Mesorectum: Complete. Tumor, Tumor Site: Descending colon. Histologic Type:  Adenocarcinoma. Histologic Grade: G2, moderately differentiated. Tumor Size:  4.5 x 3.3 x 0.8 Centimeters (cm). Tumor Extent: Invades through muscularis propria into the pericolonic or perirectal tissue. Macroscopic Tumor Perforation: Not identified. Lymphatic and / or Vascular Invasion:  Small vessel. Perineural Invasion: Present. Tumor Budding Score: High (10 or more). Number of Tumor Buds: 12 per 'hotspot' field. Type of Polyp in which Invasive Carcinoma Arose:  None identified. Treatment Effect: No known presurgical therapy. Margins, Margin Status for Invasive Carcinoma:  All margins negative for invasive carcinoma. Closest Margin(s) to Invasive Carcinoma:  Radial (circumferential) - Retroperitoneal. Distance from Invasive Carcinoma to Closest Margin:  2.0 cm. Margin Status for Non-Invasive Tumor:  All margins negative. for high-grade dysplasia / intramucosal carcinoma and low-grade dysplasia. Regional Lymph Nodes, Regional Lymph Node Status: All regional lymph nodes negative for tumor. Number of Lymph Nodes Examined: 23. Tumor Deposits: Not identified. pTNM Classification (AJCC 8th Edition) pT Category: pT3. pN Category: pN0.  1: DNA Mismatch Repair Biomarker. DNA Mismatch Repair Testing. Specimen Site: Descending colon. Immunohistochemistry (IHC) Results for Mismatch Repair (MMR) Proteins:  Background non-neoplastic tissue / internal control shows intact nuclear expression. MLH1 Result:   Intact nuclear expression. MSH2 Result: Intact nuclear expression. MSH6 Result: Intact nuclear expression. PMS2 Result: Intact nuclear expression. Mismatch Repair (MMR) Interpretation:  No loss of nuclear expression of MMR proteins: No evidence of deficient mismatch repair (low probability of MSI-H).   [de-identified] : 3/12/24- Invasive mod diff  adenocarcinoma with mucinous features and tubulovillous adenoma.  [de-identified] : 3/12/24- Mod diff adenoca, 4.5 cm, MMR-P, margins neg, LVI small vessel pos, PNI pos, TD- neg, high budding, 0/28 nodes - high risk stage II colon ca left sided. Discussed Folfox and Xeloda Matuncle - colon c age 78 [de-identified] : S/p C3 capecitabine 3 pills in the morning and 3 pills in the evening 14 days on 7 days off.  He feels fine except increased fatigue. His appetite is okay. He has no diarrhea, constipation, pain, and no mouth sores.

## 2024-08-29 ENCOUNTER — RESULT REVIEW (OUTPATIENT)
Age: 74
End: 2024-08-29

## 2024-08-29 ENCOUNTER — APPOINTMENT (OUTPATIENT)
Dept: HEMATOLOGY ONCOLOGY | Facility: CLINIC | Age: 74
End: 2024-08-29

## 2024-08-29 ENCOUNTER — APPOINTMENT (OUTPATIENT)
Dept: HEMATOLOGY ONCOLOGY | Facility: CLINIC | Age: 74
End: 2024-08-29
Payer: MEDICARE

## 2024-08-29 VITALS
WEIGHT: 190.9 LBS | OXYGEN SATURATION: 99 % | RESPIRATION RATE: 16 BRPM | DIASTOLIC BLOOD PRESSURE: 72 MMHG | SYSTOLIC BLOOD PRESSURE: 140 MMHG | HEART RATE: 52 BPM | TEMPERATURE: 97.7 F | HEIGHT: 67.01 IN | BODY MASS INDEX: 29.96 KG/M2

## 2024-08-29 DIAGNOSIS — C18.6 MALIGNANT NEOPLASM OF DESCENDING COLON: ICD-10-CM

## 2024-08-29 LAB
BASOPHILS # BLD AUTO: 0.07 K/UL — SIGNIFICANT CHANGE UP (ref 0–0.2)
BASOPHILS NFR BLD AUTO: 0.7 % — SIGNIFICANT CHANGE UP (ref 0–2)
EOSINOPHIL # BLD AUTO: 0.61 K/UL — HIGH (ref 0–0.5)
EOSINOPHIL NFR BLD AUTO: 6.1 % — HIGH (ref 0–6)
HCT VFR BLD CALC: 36.5 % — LOW (ref 39–50)
HGB BLD-MCNC: 13.5 G/DL — SIGNIFICANT CHANGE UP (ref 13–17)
IMM GRANULOCYTES NFR BLD AUTO: 1.1 % — HIGH (ref 0–0.9)
LYMPHOCYTES # BLD AUTO: 2.65 K/UL — SIGNIFICANT CHANGE UP (ref 1–3.3)
LYMPHOCYTES # BLD AUTO: 26.6 % — SIGNIFICANT CHANGE UP (ref 13–44)
MCHC RBC-ENTMCNC: 32.6 PG — SIGNIFICANT CHANGE UP (ref 27–34)
MCHC RBC-ENTMCNC: 37 G/DL — HIGH (ref 32–36)
MCV RBC AUTO: 88.2 FL — SIGNIFICANT CHANGE UP (ref 80–100)
MONOCYTES # BLD AUTO: 0.87 K/UL — SIGNIFICANT CHANGE UP (ref 0–0.9)
MONOCYTES NFR BLD AUTO: 8.7 % — SIGNIFICANT CHANGE UP (ref 2–14)
NEUTROPHILS # BLD AUTO: 5.65 K/UL — SIGNIFICANT CHANGE UP (ref 1.8–7.4)
NEUTROPHILS NFR BLD AUTO: 56.8 % — SIGNIFICANT CHANGE UP (ref 43–77)
NRBC # BLD: 0 /100 WBCS — SIGNIFICANT CHANGE UP (ref 0–0)
PLATELET # BLD AUTO: 141 K/UL — LOW (ref 150–400)
RBC # BLD: 4.14 M/UL — LOW (ref 4.2–5.8)
RBC # FLD: 21.5 % — HIGH (ref 10.3–14.5)
WBC # BLD: 9.96 K/UL — SIGNIFICANT CHANGE UP (ref 3.8–10.5)
WBC # FLD AUTO: 9.96 K/UL — SIGNIFICANT CHANGE UP (ref 3.8–10.5)

## 2024-08-29 PROCEDURE — 99213 OFFICE O/P EST LOW 20 MIN: CPT

## 2024-08-29 NOTE — REVIEW OF SYSTEMS
[Cough] : cough [Diarrhea: Grade 0] : Diarrhea: Grade 0 [Negative] : Allergic/Immunologic [FreeTextEntry8] : slow uribnation at night

## 2024-08-29 NOTE — HISTORY OF PRESENT ILLNESS
[Disease: _____________________] : Disease: [unfilled] [T: ___] : T[unfilled] [N: ___] : N[unfilled] [M: ___] : M[unfilled] [AJCC Stage: ____] : AJCC Stage: [unfilled] [de-identified] : 73 years old male with PMHX of Hypercholesterolemia, Hypertension, CAD, S/p PCI x 1, Thyroid nodule comes for evaluation of newly diagnosed left sided Colon Cancer.   The patient history dates back 5 months ago when he noted the onset of constipation and intermittent rectal bleeding.  He also noted a 12 pound weight loss.  Because of the constipation he was referred for colonoscopy.  This was performed by Dr. Farrar on February 22, 2024 which revealed a mass in the descending colon which on biopsy revealed fragments of invasive adenocarcinoma with mucinous features and tubulovillous adenoma.  MMR testing was intact.  On February 26 the patient underwent CAT scan of the chest abdomen pelvis which revealed thyroid nodules, extensive coronary arterial calcifications a 2 cm cyst in the right hepatic lobe and punctate calcifications in the pancreas suggesting chronic pancreatitis.  There was also adrenal nodules measuring 1.8 cm and 2.7 cm and MRI was recommended.  There was also a 1.4 cm lesion in the right kidney.  The prostate was enlarged and there was a left inguinal hernia noted.  There was no colonic mass noted on CAT scan.  On February 28, 2024 the patient underwent surgical oncology evaluation and was recommended that he was a candidate for laparoscopic assisted left colectomy.  The CEA was 0.8.  The surgery was performed on March 12, 2024.  The pathology revealed left colon and rectosigmoid resection for a moderately differentiated adenocarcinoma measuring 4.5 cm.  The tumor was T3 N0 stage II.  There was small vessel lymphatic vascular invasion present along with perineural invasion.  Tumor deposits were absent.  The margins were negative and overall there was 28 lymph nodes negative for metastases.  The MMR was proficient.  The patient now returns to discuss further management.  02/22/2024, Colonoscopy: A. Colon, Transverse, Polyp, Biopsy-Tubular adenoma. No high-grade dysplasia seen.. B. Colon, Ascending, Polyp, Biopsy- Tubulovillous adenoma. No high-grade dysplasia seen.  C. Colon, Descending Biopsy- Detached fragments of invasive adenocarcinoma with mucinous features and tubulovillous adenoma.  Immunohistochemistry staining for the tested DNA mismatch repair protein was as follows: MLH1: Staining present in tumor. MSH2: Staining present in tumor. MSH6: Staining present in tumor. Conclusion: Immunohistochemical staining for the tested DNA mismatch repair protein is retained in the tumor.   02/26/2024, CT-Scan of Chest/Abdomen/pelvis: Enlarged thyroid gland with small nodules in the left gland. This may be further evaluated with a thyroid ultrasound. Pancreatic calcifications suggesting chronic pancreatitis. Bilateral adrenal nodules incompletely evaluated on this study. These may be further evaluated with a CT or MRI with adrenal protocol. Hyperdense lesion un the upper pole of right kidney. Further evaluation with a CT urogram recommended. Thick-walled urinary bladder with a right sided diverticulum. Markedly enlarged prostate gland. Fat-containing left inguinal hernia.   03/12/2024, Surgical Resection report was a follow:  1.  Left colon and rectosigmoid, resection- Moderately differentiated adenocarcinoma (4.5cm).  Resection margins negative for tumor. Pathological stage classification (pTNM, AJCC 8th Ed): pT3N0. See Synoptic Summary. See MMR Synoptic Summary.  2.  Proximal donut, resection-   Unremarkable segment of colon.  3.  Distal donut, resection- Segment of the colon with mucosal and submucosal congestion.  Synoptic Summary 1: Colon and Rectum - Resection. Specimen. Procedure: Low anterior resection. Macroscopic Evaluation of Mesorectum: Complete. Tumor, Tumor Site: Descending colon. Histologic Type:  Adenocarcinoma. Histologic Grade: G2, moderately differentiated. Tumor Size:  4.5 x 3.3 x 0.8 Centimeters (cm). Tumor Extent: Invades through muscularis propria into the pericolonic or perirectal tissue. Macroscopic Tumor Perforation: Not identified. Lymphatic and / or Vascular Invasion:  Small vessel. Perineural Invasion: Present. Tumor Budding Score: High (10 or more). Number of Tumor Buds: 12 per 'hotspot' field. Type of Polyp in which Invasive Carcinoma Arose:  None identified. Treatment Effect: No known presurgical therapy. Margins, Margin Status for Invasive Carcinoma:  All margins negative for invasive carcinoma. Closest Margin(s) to Invasive Carcinoma:  Radial (circumferential) - Retroperitoneal. Distance from Invasive Carcinoma to Closest Margin:  2.0 cm. Margin Status for Non-Invasive Tumor:  All margins negative. for high-grade dysplasia / intramucosal carcinoma and low-grade dysplasia. Regional Lymph Nodes, Regional Lymph Node Status: All regional lymph nodes negative for tumor. Number of Lymph Nodes Examined: 23. Tumor Deposits: Not identified. pTNM Classification (AJCC 8th Edition) pT Category: pT3. pN Category: pN0.  1: DNA Mismatch Repair Biomarker. DNA Mismatch Repair Testing. Specimen Site: Descending colon. Immunohistochemistry (IHC) Results for Mismatch Repair (MMR) Proteins:  Background non-neoplastic tissue / internal control shows intact nuclear expression. MLH1 Result:   Intact nuclear expression. MSH2 Result: Intact nuclear expression. MSH6 Result: Intact nuclear expression. PMS2 Result: Intact nuclear expression. Mismatch Repair (MMR) Interpretation:  No loss of nuclear expression of MMR proteins: No evidence of deficient mismatch repair (low probability of MSI-H).   [de-identified] : 3/12/24- Invasive mod diff  adenocarcinoma with mucinous features and tubulovillous adenoma.  [de-identified] : 3/12/24- Mod diff adenoca, 4.5 cm, MMR-P, margins neg, LVI small vessel pos, PNI pos, TD- neg, high budding, 0/28 nodes - high risk stage II colon ca left sided. Discussed Folfox and Xeloda Matuncle - colon c age 78 [de-identified] : Since the last visit the pt is on xeloda and tolerance rx well. Pt has fatigue, metallic taste and nolonger constipated.

## 2024-08-29 NOTE — ASSESSMENT
[FreeTextEntry1] : The patient is completing his fourth cycle of treatment and tolerates therapy complaining only of some fatigue and irritation of the feet when walking.  He currently will continue capecitabine 3 pills twice a day 14 days on and 7 days off.  He will continue to be monitored for side effects and toxicity.  The plan is to attempt to complete 8 cycles of therapy.  He was again cautioned to continue using moisturizing creams to the hands and feet as described. [Curative] : Goals of care discussed with patient: Curative

## 2024-09-03 LAB
ALBUMIN SERPL ELPH-MCNC: 4.2 G/DL
ALP BLD-CCNC: 123 U/L
ALT SERPL-CCNC: 21 U/L
ANION GAP SERPL CALC-SCNC: 13 MMOL/L
AST SERPL-CCNC: 23 U/L
BILIRUB SERPL-MCNC: 0.8 MG/DL
BUN SERPL-MCNC: 18 MG/DL
CALCIUM SERPL-MCNC: 8.7 MG/DL
CEA SERPL-MCNC: 1.3 NG/ML
CHLORIDE SERPL-SCNC: 103 MMOL/L
CO2 SERPL-SCNC: 25 MMOL/L
CREAT SERPL-MCNC: 1.13 MG/DL
EGFR: 68 ML/MIN/1.73M2
GLUCOSE SERPL-MCNC: 155 MG/DL
POTASSIUM SERPL-SCNC: 3.8 MMOL/L
PROT SERPL-MCNC: 5.9 G/DL
SODIUM SERPL-SCNC: 141 MMOL/L

## 2024-09-13 ENCOUNTER — OUTPATIENT (OUTPATIENT)
Dept: OUTPATIENT SERVICES | Facility: HOSPITAL | Age: 74
LOS: 1 days | Discharge: HOME | End: 2024-09-13

## 2024-09-13 DIAGNOSIS — Z98.890 OTHER SPECIFIED POSTPROCEDURAL STATES: Chronic | ICD-10-CM

## 2024-09-13 DIAGNOSIS — C18.9 MALIGNANT NEOPLASM OF COLON, UNSPECIFIED: ICD-10-CM

## 2024-09-20 ENCOUNTER — APPOINTMENT (OUTPATIENT)
Dept: HEMATOLOGY ONCOLOGY | Facility: CLINIC | Age: 74
End: 2024-09-20
Payer: MEDICARE

## 2024-09-20 ENCOUNTER — RESULT REVIEW (OUTPATIENT)
Age: 74
End: 2024-09-20

## 2024-09-20 VITALS
OXYGEN SATURATION: 98 % | RESPIRATION RATE: 16 BRPM | HEART RATE: 57 BPM | WEIGHT: 190 LBS | BODY MASS INDEX: 29.75 KG/M2 | TEMPERATURE: 98 F

## 2024-09-20 VITALS — DIASTOLIC BLOOD PRESSURE: 76 MMHG | SYSTOLIC BLOOD PRESSURE: 137 MMHG

## 2024-09-20 DIAGNOSIS — C18.6 MALIGNANT NEOPLASM OF DESCENDING COLON: ICD-10-CM

## 2024-09-20 DIAGNOSIS — Z79.899 OTHER LONG TERM (CURRENT) DRUG THERAPY: ICD-10-CM

## 2024-09-20 LAB
BASOPHILS # BLD AUTO: 0.05 K/UL — SIGNIFICANT CHANGE UP (ref 0–0.2)
BASOPHILS NFR BLD AUTO: 0.6 % — SIGNIFICANT CHANGE UP (ref 0–2)
EOSINOPHIL # BLD AUTO: 0.51 K/UL — HIGH (ref 0–0.5)
EOSINOPHIL NFR BLD AUTO: 5.7 % — SIGNIFICANT CHANGE UP (ref 0–6)
HCT VFR BLD CALC: 37.5 % — LOW (ref 39–50)
HGB BLD-MCNC: 13.8 G/DL — SIGNIFICANT CHANGE UP (ref 13–17)
IMM GRANULOCYTES NFR BLD AUTO: 1 % — HIGH (ref 0–0.9)
LYMPHOCYTES # BLD AUTO: 2.59 K/UL — SIGNIFICANT CHANGE UP (ref 1–3.3)
LYMPHOCYTES # BLD AUTO: 29.2 % — SIGNIFICANT CHANGE UP (ref 13–44)
MCHC RBC-ENTMCNC: 33.3 PG — SIGNIFICANT CHANGE UP (ref 27–34)
MCHC RBC-ENTMCNC: 36.8 G/DL — HIGH (ref 32–36)
MCV RBC AUTO: 90.6 FL — SIGNIFICANT CHANGE UP (ref 80–100)
MONOCYTES # BLD AUTO: 0.87 K/UL — SIGNIFICANT CHANGE UP (ref 0–0.9)
MONOCYTES NFR BLD AUTO: 9.8 % — SIGNIFICANT CHANGE UP (ref 2–14)
NEUTROPHILS # BLD AUTO: 4.76 K/UL — SIGNIFICANT CHANGE UP (ref 1.8–7.4)
NEUTROPHILS NFR BLD AUTO: 53.7 % — SIGNIFICANT CHANGE UP (ref 43–77)
NRBC # BLD: 0 /100 WBCS — SIGNIFICANT CHANGE UP (ref 0–0)
PLATELET # BLD AUTO: 156 K/UL — SIGNIFICANT CHANGE UP (ref 150–400)
RBC # BLD: 4.14 M/UL — LOW (ref 4.2–5.8)
RBC # FLD: 21.7 % — HIGH (ref 10.3–14.5)
WBC # BLD: 8.87 K/UL — SIGNIFICANT CHANGE UP (ref 3.8–10.5)
WBC # FLD AUTO: 8.87 K/UL — SIGNIFICANT CHANGE UP (ref 3.8–10.5)

## 2024-09-20 PROCEDURE — 99214 OFFICE O/P EST MOD 30 MIN: CPT

## 2024-09-22 LAB
ALBUMIN SERPL ELPH-MCNC: 4.4 G/DL
ALP BLD-CCNC: 145 U/L
ALT SERPL-CCNC: 20 U/L
ANION GAP SERPL CALC-SCNC: 11 MMOL/L
AST SERPL-CCNC: 25 U/L
BILIRUB SERPL-MCNC: 0.9 MG/DL
BUN SERPL-MCNC: 15 MG/DL
CALCIUM SERPL-MCNC: 9.5 MG/DL
CEA SERPL-MCNC: 1.4 NG/ML
CHLORIDE SERPL-SCNC: 103 MMOL/L
CO2 SERPL-SCNC: 27 MMOL/L
CREAT SERPL-MCNC: 1.26 MG/DL
EGFR: 59 ML/MIN/1.73M2
GLUCOSE SERPL-MCNC: 145 MG/DL
POTASSIUM SERPL-SCNC: 4 MMOL/L
PROT SERPL-MCNC: 6.2 G/DL
SODIUM SERPL-SCNC: 141 MMOL/L

## 2024-09-22 NOTE — HISTORY OF PRESENT ILLNESS
[Disease: _____________________] : Disease: [unfilled] [T: ___] : T[unfilled] [N: ___] : N[unfilled] [M: ___] : M[unfilled] [AJCC Stage: ____] : AJCC Stage: [unfilled] [de-identified] : 73 years old male with PMHX of Hypercholesterolemia, Hypertension, CAD, S/p PCI x 1, Thyroid nodule comes for evaluation of newly diagnosed left sided Colon Cancer.   The patient history dates back 5 months ago when he noted the onset of constipation and intermittent rectal bleeding.  He also noted a 12 pound weight loss.  Because of the constipation he was referred for colonoscopy.  This was performed by Dr. Farrar on February 22, 2024 which revealed a mass in the descending colon which on biopsy revealed fragments of invasive adenocarcinoma with mucinous features and tubulovillous adenoma.  MMR testing was intact.  On February 26 the patient underwent CAT scan of the chest abdomen pelvis which revealed thyroid nodules, extensive coronary arterial calcifications a 2 cm cyst in the right hepatic lobe and punctate calcifications in the pancreas suggesting chronic pancreatitis.  There was also adrenal nodules measuring 1.8 cm and 2.7 cm and MRI was recommended.  There was also a 1.4 cm lesion in the right kidney.  The prostate was enlarged and there was a left inguinal hernia noted.  There was no colonic mass noted on CAT scan.  On February 28, 2024 the patient underwent surgical oncology evaluation and was recommended that he was a candidate for laparoscopic assisted left colectomy.  The CEA was 0.8.  The surgery was performed on March 12, 2024.  The pathology revealed left colon and rectosigmoid resection for a moderately differentiated adenocarcinoma measuring 4.5 cm.  The tumor was T3 N0 stage II.  There was small vessel lymphatic vascular invasion present along with perineural invasion.  Tumor deposits were absent.  The margins were negative and overall there was 28 lymph nodes negative for metastases.  The MMR was proficient.  The patient now returns to discuss further management.  02/22/2024, Colonoscopy: A. Colon, Transverse, Polyp, Biopsy-Tubular adenoma. No high-grade dysplasia seen.. B. Colon, Ascending, Polyp, Biopsy- Tubulovillous adenoma. No high-grade dysplasia seen.  C. Colon, Descending Biopsy- Detached fragments of invasive adenocarcinoma with mucinous features and tubulovillous adenoma.  Immunohistochemistry staining for the tested DNA mismatch repair protein was as follows: MLH1: Staining present in tumor. MSH2: Staining present in tumor. MSH6: Staining present in tumor. Conclusion: Immunohistochemical staining for the tested DNA mismatch repair protein is retained in the tumor.   02/26/2024, CT-Scan of Chest/Abdomen/pelvis: Enlarged thyroid gland with small nodules in the left gland. This may be further evaluated with a thyroid ultrasound. Pancreatic calcifications suggesting chronic pancreatitis. Bilateral adrenal nodules incompletely evaluated on this study. These may be further evaluated with a CT or MRI with adrenal protocol. Hyperdense lesion un the upper pole of right kidney. Further evaluation with a CT urogram recommended. Thick-walled urinary bladder with a right sided diverticulum. Markedly enlarged prostate gland. Fat-containing left inguinal hernia.   03/12/2024, Surgical Resection report was a follow:  1.  Left colon and rectosigmoid, resection- Moderately differentiated adenocarcinoma (4.5cm).  Resection margins negative for tumor. Pathological stage classification (pTNM, AJCC 8th Ed): pT3N0. See Synoptic Summary. See MMR Synoptic Summary.  2.  Proximal donut, resection-   Unremarkable segment of colon.  3.  Distal donut, resection- Segment of the colon with mucosal and submucosal congestion.  Synoptic Summary 1: Colon and Rectum - Resection. Specimen. Procedure: Low anterior resection. Macroscopic Evaluation of Mesorectum: Complete. Tumor, Tumor Site: Descending colon. Histologic Type:  Adenocarcinoma. Histologic Grade: G2, moderately differentiated. Tumor Size:  4.5 x 3.3 x 0.8 Centimeters (cm). Tumor Extent: Invades through muscularis propria into the pericolonic or perirectal tissue. Macroscopic Tumor Perforation: Not identified. Lymphatic and / or Vascular Invasion:  Small vessel. Perineural Invasion: Present. Tumor Budding Score: High (10 or more). Number of Tumor Buds: 12 per 'hotspot' field. Type of Polyp in which Invasive Carcinoma Arose:  None identified. Treatment Effect: No known presurgical therapy. Margins, Margin Status for Invasive Carcinoma:  All margins negative for invasive carcinoma. Closest Margin(s) to Invasive Carcinoma:  Radial (circumferential) - Retroperitoneal. Distance from Invasive Carcinoma to Closest Margin:  2.0 cm. Margin Status for Non-Invasive Tumor:  All margins negative. for high-grade dysplasia / intramucosal carcinoma and low-grade dysplasia. Regional Lymph Nodes, Regional Lymph Node Status: All regional lymph nodes negative for tumor. Number of Lymph Nodes Examined: 23. Tumor Deposits: Not identified. pTNM Classification (AJCC 8th Edition) pT Category: pT3. pN Category: pN0.  1: DNA Mismatch Repair Biomarker. DNA Mismatch Repair Testing. Specimen Site: Descending colon. Immunohistochemistry (IHC) Results for Mismatch Repair (MMR) Proteins:  Background non-neoplastic tissue / internal control shows intact nuclear expression. MLH1 Result:   Intact nuclear expression. MSH2 Result: Intact nuclear expression. MSH6 Result: Intact nuclear expression. PMS2 Result: Intact nuclear expression. Mismatch Repair (MMR) Interpretation:  No loss of nuclear expression of MMR proteins: No evidence of deficient mismatch repair (low probability of MSI-H).   [de-identified] : 3/12/24- Mod diff adenoca, 4.5 cm, MMR-P, margins neg, LVI small vessel pos, PNI pos, TD- neg, high budding, 0/28 nodes - high risk stage II colon ca left sided. Discussed Folfox and Xeloda Matuncle - colon c age 78 [de-identified] : 3/12/24- Invasive mod diff  adenocarcinoma with mucinous features and tubulovillous adenoma.  [de-identified] : S/p C5 capecitabine 3 pills in the morning and 3 pills in the evening 14 days on 7 days off.  He feels fine. His appetite is okay. He has no diarrhea, constipation, pain, and no mouth sores.

## 2024-09-22 NOTE — HISTORY OF PRESENT ILLNESS
[Disease: _____________________] : Disease: [unfilled] [T: ___] : T[unfilled] [N: ___] : N[unfilled] [M: ___] : M[unfilled] [AJCC Stage: ____] : AJCC Stage: [unfilled] [de-identified] : 73 years old male with PMHX of Hypercholesterolemia, Hypertension, CAD, S/p PCI x 1, Thyroid nodule comes for evaluation of newly diagnosed left sided Colon Cancer.   The patient history dates back 5 months ago when he noted the onset of constipation and intermittent rectal bleeding.  He also noted a 12 pound weight loss.  Because of the constipation he was referred for colonoscopy.  This was performed by Dr. Farrar on February 22, 2024 which revealed a mass in the descending colon which on biopsy revealed fragments of invasive adenocarcinoma with mucinous features and tubulovillous adenoma.  MMR testing was intact.  On February 26 the patient underwent CAT scan of the chest abdomen pelvis which revealed thyroid nodules, extensive coronary arterial calcifications a 2 cm cyst in the right hepatic lobe and punctate calcifications in the pancreas suggesting chronic pancreatitis.  There was also adrenal nodules measuring 1.8 cm and 2.7 cm and MRI was recommended.  There was also a 1.4 cm lesion in the right kidney.  The prostate was enlarged and there was a left inguinal hernia noted.  There was no colonic mass noted on CAT scan.  On February 28, 2024 the patient underwent surgical oncology evaluation and was recommended that he was a candidate for laparoscopic assisted left colectomy.  The CEA was 0.8.  The surgery was performed on March 12, 2024.  The pathology revealed left colon and rectosigmoid resection for a moderately differentiated adenocarcinoma measuring 4.5 cm.  The tumor was T3 N0 stage II.  There was small vessel lymphatic vascular invasion present along with perineural invasion.  Tumor deposits were absent.  The margins were negative and overall there was 28 lymph nodes negative for metastases.  The MMR was proficient.  The patient now returns to discuss further management.  02/22/2024, Colonoscopy: A. Colon, Transverse, Polyp, Biopsy-Tubular adenoma. No high-grade dysplasia seen.. B. Colon, Ascending, Polyp, Biopsy- Tubulovillous adenoma. No high-grade dysplasia seen.  C. Colon, Descending Biopsy- Detached fragments of invasive adenocarcinoma with mucinous features and tubulovillous adenoma.  Immunohistochemistry staining for the tested DNA mismatch repair protein was as follows: MLH1: Staining present in tumor. MSH2: Staining present in tumor. MSH6: Staining present in tumor. Conclusion: Immunohistochemical staining for the tested DNA mismatch repair protein is retained in the tumor.   02/26/2024, CT-Scan of Chest/Abdomen/pelvis: Enlarged thyroid gland with small nodules in the left gland. This may be further evaluated with a thyroid ultrasound. Pancreatic calcifications suggesting chronic pancreatitis. Bilateral adrenal nodules incompletely evaluated on this study. These may be further evaluated with a CT or MRI with adrenal protocol. Hyperdense lesion un the upper pole of right kidney. Further evaluation with a CT urogram recommended. Thick-walled urinary bladder with a right sided diverticulum. Markedly enlarged prostate gland. Fat-containing left inguinal hernia.   03/12/2024, Surgical Resection report was a follow:  1.  Left colon and rectosigmoid, resection- Moderately differentiated adenocarcinoma (4.5cm).  Resection margins negative for tumor. Pathological stage classification (pTNM, AJCC 8th Ed): pT3N0. See Synoptic Summary. See MMR Synoptic Summary.  2.  Proximal donut, resection-   Unremarkable segment of colon.  3.  Distal donut, resection- Segment of the colon with mucosal and submucosal congestion.  Synoptic Summary 1: Colon and Rectum - Resection. Specimen. Procedure: Low anterior resection. Macroscopic Evaluation of Mesorectum: Complete. Tumor, Tumor Site: Descending colon. Histologic Type:  Adenocarcinoma. Histologic Grade: G2, moderately differentiated. Tumor Size:  4.5 x 3.3 x 0.8 Centimeters (cm). Tumor Extent: Invades through muscularis propria into the pericolonic or perirectal tissue. Macroscopic Tumor Perforation: Not identified. Lymphatic and / or Vascular Invasion:  Small vessel. Perineural Invasion: Present. Tumor Budding Score: High (10 or more). Number of Tumor Buds: 12 per 'hotspot' field. Type of Polyp in which Invasive Carcinoma Arose:  None identified. Treatment Effect: No known presurgical therapy. Margins, Margin Status for Invasive Carcinoma:  All margins negative for invasive carcinoma. Closest Margin(s) to Invasive Carcinoma:  Radial (circumferential) - Retroperitoneal. Distance from Invasive Carcinoma to Closest Margin:  2.0 cm. Margin Status for Non-Invasive Tumor:  All margins negative. for high-grade dysplasia / intramucosal carcinoma and low-grade dysplasia. Regional Lymph Nodes, Regional Lymph Node Status: All regional lymph nodes negative for tumor. Number of Lymph Nodes Examined: 23. Tumor Deposits: Not identified. pTNM Classification (AJCC 8th Edition) pT Category: pT3. pN Category: pN0.  1: DNA Mismatch Repair Biomarker. DNA Mismatch Repair Testing. Specimen Site: Descending colon. Immunohistochemistry (IHC) Results for Mismatch Repair (MMR) Proteins:  Background non-neoplastic tissue / internal control shows intact nuclear expression. MLH1 Result:   Intact nuclear expression. MSH2 Result: Intact nuclear expression. MSH6 Result: Intact nuclear expression. PMS2 Result: Intact nuclear expression. Mismatch Repair (MMR) Interpretation:  No loss of nuclear expression of MMR proteins: No evidence of deficient mismatch repair (low probability of MSI-H).   [de-identified] : 3/12/24- Mod diff adenoca, 4.5 cm, MMR-P, margins neg, LVI small vessel pos, PNI pos, TD- neg, high budding, 0/28 nodes - high risk stage II colon ca left sided. Discussed Folfox and Xeloda Matuncle - colon c age 78 [de-identified] : 3/12/24- Invasive mod diff  adenocarcinoma with mucinous features and tubulovillous adenoma.  [de-identified] : S/p C5 capecitabine 3 pills in the morning and 3 pills in the evening 14 days on 7 days off.  He feels fine. His appetite is okay. He has no diarrhea, constipation, pain, and no mouth sores.

## 2024-09-22 NOTE — ASSESSMENT
[Curative] : Goals of care discussed with patient: Curative [FreeTextEntry1] : S/p C5 Xeloda/ He currently will continue to cycle 6 of Capecitabine 3 pills twice a day 14 days on and 7 days off.  He will continue to be monitored for side effects and toxicity.  The plan is to attempt to complete 8 cycles of therapy.  He was again cautioned to continue using moisturizing creams to the hands and feet as described. I ordered repeat blood tests with tumor markers, f/u. After cycle of treatment, scan will be done of abdomen/pelvis/chest to assess for recurrent disease. RTO in 3 weeks.

## 2024-10-11 ENCOUNTER — RESULT REVIEW (OUTPATIENT)
Age: 74
End: 2024-10-11

## 2024-10-11 ENCOUNTER — APPOINTMENT (OUTPATIENT)
Dept: HEMATOLOGY ONCOLOGY | Facility: CLINIC | Age: 74
End: 2024-10-11
Payer: MEDICARE

## 2024-10-11 VITALS
SYSTOLIC BLOOD PRESSURE: 119 MMHG | RESPIRATION RATE: 16 BRPM | BODY MASS INDEX: 30.24 KG/M2 | WEIGHT: 193.1 LBS | OXYGEN SATURATION: 98 % | TEMPERATURE: 97.5 F | DIASTOLIC BLOOD PRESSURE: 76 MMHG | HEART RATE: 48 BPM

## 2024-10-11 DIAGNOSIS — C18.6 MALIGNANT NEOPLASM OF DESCENDING COLON: ICD-10-CM

## 2024-10-11 DIAGNOSIS — Z79.899 OTHER LONG TERM (CURRENT) DRUG THERAPY: ICD-10-CM

## 2024-10-11 LAB
BASOPHILS # BLD AUTO: 0.06 K/UL — SIGNIFICANT CHANGE UP (ref 0–0.2)
BASOPHILS NFR BLD AUTO: 0.8 % — SIGNIFICANT CHANGE UP (ref 0–2)
EOSINOPHIL # BLD AUTO: 0.5 K/UL — SIGNIFICANT CHANGE UP (ref 0–0.5)
EOSINOPHIL NFR BLD AUTO: 6.6 % — HIGH (ref 0–6)
HCT VFR BLD CALC: 38.7 % — LOW (ref 39–50)
HGB BLD-MCNC: 13.9 G/DL — SIGNIFICANT CHANGE UP (ref 13–17)
IMM GRANULOCYTES NFR BLD AUTO: 0.9 % — SIGNIFICANT CHANGE UP (ref 0–0.9)
LYMPHOCYTES # BLD AUTO: 2.01 K/UL — SIGNIFICANT CHANGE UP (ref 1–3.3)
LYMPHOCYTES # BLD AUTO: 26.6 % — SIGNIFICANT CHANGE UP (ref 13–44)
MCHC RBC-ENTMCNC: 34.7 PG — HIGH (ref 27–34)
MCHC RBC-ENTMCNC: 35.9 G/DL — SIGNIFICANT CHANGE UP (ref 32–36)
MCV RBC AUTO: 96.5 FL — SIGNIFICANT CHANGE UP (ref 80–100)
MONOCYTES # BLD AUTO: 0.61 K/UL — SIGNIFICANT CHANGE UP (ref 0–0.9)
MONOCYTES NFR BLD AUTO: 8.1 % — SIGNIFICANT CHANGE UP (ref 2–14)
NEUTROPHILS # BLD AUTO: 4.3 K/UL — SIGNIFICANT CHANGE UP (ref 1.8–7.4)
NEUTROPHILS NFR BLD AUTO: 57 % — SIGNIFICANT CHANGE UP (ref 43–77)
NRBC # BLD: 0 /100 WBCS — SIGNIFICANT CHANGE UP (ref 0–0)
PLATELET # BLD AUTO: 145 K/UL — LOW (ref 150–400)
RBC # BLD: 4.01 M/UL — LOW (ref 4.2–5.8)
RBC # FLD: 20.3 % — HIGH (ref 10.3–14.5)
WBC # BLD: 7.55 K/UL — SIGNIFICANT CHANGE UP (ref 3.8–10.5)
WBC # FLD AUTO: 7.55 K/UL — SIGNIFICANT CHANGE UP (ref 3.8–10.5)

## 2024-10-11 PROCEDURE — 99214 OFFICE O/P EST MOD 30 MIN: CPT

## 2024-10-12 LAB
ALBUMIN SERPL ELPH-MCNC: 4.2 G/DL
ALP BLD-CCNC: 117 U/L
ALT SERPL-CCNC: 22 U/L
ANION GAP SERPL CALC-SCNC: 14 MMOL/L
AST SERPL-CCNC: 26 U/L
BILIRUB SERPL-MCNC: 0.9 MG/DL
BUN SERPL-MCNC: 13 MG/DL
CALCIUM SERPL-MCNC: 9.5 MG/DL
CEA SERPL-MCNC: 1.4 NG/ML
CHLORIDE SERPL-SCNC: 102 MMOL/L
CO2 SERPL-SCNC: 24 MMOL/L
CREAT SERPL-MCNC: 1.3 MG/DL
EGFR: 57 ML/MIN/1.73M2
GLUCOSE SERPL-MCNC: 242 MG/DL
POTASSIUM SERPL-SCNC: 4.4 MMOL/L
PROT SERPL-MCNC: 5.9 G/DL
SODIUM SERPL-SCNC: 141 MMOL/L

## 2024-10-31 ENCOUNTER — APPOINTMENT (OUTPATIENT)
Dept: HEMATOLOGY ONCOLOGY | Facility: CLINIC | Age: 74
End: 2024-10-31
Payer: MEDICARE

## 2024-10-31 ENCOUNTER — RESULT REVIEW (OUTPATIENT)
Age: 74
End: 2024-10-31

## 2024-10-31 ENCOUNTER — NON-APPOINTMENT (OUTPATIENT)
Age: 74
End: 2024-10-31

## 2024-10-31 VITALS — OXYGEN SATURATION: 99 % | HEIGHT: 67.01 IN | HEART RATE: 40 BPM | TEMPERATURE: 99 F

## 2024-10-31 DIAGNOSIS — C18.6 MALIGNANT NEOPLASM OF DESCENDING COLON: ICD-10-CM

## 2024-10-31 LAB
ALBUMIN SERPL ELPH-MCNC: 4.6 G/DL
ALP BLD-CCNC: 134 U/L
ALT SERPL-CCNC: 24 U/L
ANION GAP SERPL CALC-SCNC: 12 MMOL/L
AST SERPL-CCNC: 28 U/L
BASOPHILS # BLD AUTO: 0.07 K/UL — SIGNIFICANT CHANGE UP (ref 0–0.2)
BASOPHILS NFR BLD AUTO: 0.6 % — SIGNIFICANT CHANGE UP (ref 0–2)
BILIRUB SERPL-MCNC: 1 MG/DL
BUN SERPL-MCNC: 18 MG/DL
CALCIUM SERPL-MCNC: 10 MG/DL
CEA SERPL-MCNC: 1.6 NG/ML
CHLORIDE SERPL-SCNC: 103 MMOL/L
CO2 SERPL-SCNC: 27 MMOL/L
CREAT SERPL-MCNC: 1.2 MG/DL
EGFR: 63 ML/MIN/1.73M2
EOSINOPHIL # BLD AUTO: 0.53 K/UL — HIGH (ref 0–0.5)
EOSINOPHIL NFR BLD AUTO: 4.7 % — SIGNIFICANT CHANGE UP (ref 0–6)
GLUCOSE SERPL-MCNC: 87 MG/DL
HCT VFR BLD CALC: 39.3 % — SIGNIFICANT CHANGE UP (ref 39–50)
HGB BLD-MCNC: 14.4 G/DL — SIGNIFICANT CHANGE UP (ref 13–17)
IMM GRANULOCYTES NFR BLD AUTO: 1.6 % — HIGH (ref 0–0.9)
LYMPHOCYTES # BLD AUTO: 3.77 K/UL — HIGH (ref 1–3.3)
LYMPHOCYTES # BLD AUTO: 33.5 % — SIGNIFICANT CHANGE UP (ref 13–44)
MCHC RBC-ENTMCNC: 35.4 PG — HIGH (ref 27–34)
MCHC RBC-ENTMCNC: 36.6 G/DL — HIGH (ref 32–36)
MCV RBC AUTO: 96.6 FL — SIGNIFICANT CHANGE UP (ref 80–100)
MONOCYTES # BLD AUTO: 1.13 K/UL — HIGH (ref 0–0.9)
MONOCYTES NFR BLD AUTO: 10.1 % — SIGNIFICANT CHANGE UP (ref 2–14)
NEUTROPHILS # BLD AUTO: 5.56 K/UL — SIGNIFICANT CHANGE UP (ref 1.8–7.4)
NEUTROPHILS NFR BLD AUTO: 49.5 % — SIGNIFICANT CHANGE UP (ref 43–77)
NRBC # BLD: 0 /100 WBCS — SIGNIFICANT CHANGE UP (ref 0–0)
PLATELET # BLD AUTO: 144 K/UL — LOW (ref 150–400)
POTASSIUM SERPL-SCNC: 4.4 MMOL/L
PROT SERPL-MCNC: 6.6 G/DL
RBC # BLD: 4.07 M/UL — LOW (ref 4.2–5.8)
RBC # FLD: 19.3 % — HIGH (ref 10.3–14.5)
SODIUM SERPL-SCNC: 141 MMOL/L
WBC # BLD: 11.24 K/UL — HIGH (ref 3.8–10.5)
WBC # FLD AUTO: 11.24 K/UL — HIGH (ref 3.8–10.5)

## 2024-10-31 PROCEDURE — 93010 ELECTROCARDIOGRAM REPORT: CPT

## 2024-10-31 PROCEDURE — 99214 OFFICE O/P EST MOD 30 MIN: CPT

## 2024-11-25 ENCOUNTER — OUTPATIENT (OUTPATIENT)
Dept: OUTPATIENT SERVICES | Facility: HOSPITAL | Age: 74
LOS: 1 days | Discharge: HOME | End: 2024-11-25
Payer: MEDICARE

## 2024-11-25 DIAGNOSIS — Z98.890 OTHER SPECIFIED POSTPROCEDURAL STATES: Chronic | ICD-10-CM

## 2024-11-25 DIAGNOSIS — C18.9 MALIGNANT NEOPLASM OF COLON, UNSPECIFIED: ICD-10-CM

## 2024-12-02 ENCOUNTER — APPOINTMENT (OUTPATIENT)
Dept: HEMATOLOGY ONCOLOGY | Facility: CLINIC | Age: 74
End: 2024-12-02
Payer: MEDICARE

## 2024-12-02 ENCOUNTER — RESULT REVIEW (OUTPATIENT)
Age: 74
End: 2024-12-02

## 2024-12-02 VITALS
OXYGEN SATURATION: 95 % | RESPIRATION RATE: 16 BRPM | HEART RATE: 55 BPM | BODY MASS INDEX: 30.58 KG/M2 | DIASTOLIC BLOOD PRESSURE: 76 MMHG | SYSTOLIC BLOOD PRESSURE: 142 MMHG | TEMPERATURE: 97.4 F | WEIGHT: 195.33 LBS

## 2024-12-02 DIAGNOSIS — C18.6 MALIGNANT NEOPLASM OF DESCENDING COLON: ICD-10-CM

## 2024-12-02 LAB
BASOPHILS # BLD AUTO: 0.06 K/UL — SIGNIFICANT CHANGE UP (ref 0–0.2)
BASOPHILS NFR BLD AUTO: 0.5 % — SIGNIFICANT CHANGE UP (ref 0–2)
EOSINOPHIL # BLD AUTO: 0.61 K/UL — HIGH (ref 0–0.5)
EOSINOPHIL NFR BLD AUTO: 5 % — SIGNIFICANT CHANGE UP (ref 0–6)
HCT VFR BLD CALC: 44.5 % — SIGNIFICANT CHANGE UP (ref 39–50)
HGB BLD-MCNC: 15.5 G/DL — SIGNIFICANT CHANGE UP (ref 13–17)
IMM GRANULOCYTES NFR BLD AUTO: 0.6 % — SIGNIFICANT CHANGE UP (ref 0–0.9)
LYMPHOCYTES # BLD AUTO: 26.6 % — SIGNIFICANT CHANGE UP (ref 13–44)
LYMPHOCYTES # BLD AUTO: 3.26 K/UL — SIGNIFICANT CHANGE UP (ref 1–3.3)
MCHC RBC-ENTMCNC: 32.2 PG — SIGNIFICANT CHANGE UP (ref 27–34)
MCHC RBC-ENTMCNC: 34.8 G/DL — SIGNIFICANT CHANGE UP (ref 32–36)
MCV RBC AUTO: 92.5 FL — SIGNIFICANT CHANGE UP (ref 80–100)
MONOCYTES # BLD AUTO: 0.89 K/UL — SIGNIFICANT CHANGE UP (ref 0–0.9)
MONOCYTES NFR BLD AUTO: 7.3 % — SIGNIFICANT CHANGE UP (ref 2–14)
NEUTROPHILS # BLD AUTO: 7.36 K/UL — SIGNIFICANT CHANGE UP (ref 1.8–7.4)
NEUTROPHILS NFR BLD AUTO: 60 % — SIGNIFICANT CHANGE UP (ref 43–77)
NRBC # BLD: 0 /100 WBCS — SIGNIFICANT CHANGE UP (ref 0–0)
NRBC BLD-RTO: 0 /100 WBCS — SIGNIFICANT CHANGE UP (ref 0–0)
PLATELET # BLD AUTO: 172 K/UL — SIGNIFICANT CHANGE UP (ref 150–400)
RBC # BLD: 4.81 M/UL — SIGNIFICANT CHANGE UP (ref 4.2–5.8)
RBC # FLD: 13.8 % — SIGNIFICANT CHANGE UP (ref 10.3–14.5)
WBC # BLD: 12.25 K/UL — HIGH (ref 3.8–10.5)
WBC # FLD AUTO: 12.25 K/UL — HIGH (ref 3.8–10.5)

## 2024-12-02 PROCEDURE — 99213 OFFICE O/P EST LOW 20 MIN: CPT

## 2024-12-03 LAB
ALBUMIN SERPL ELPH-MCNC: 4.4 G/DL
ALP BLD-CCNC: 129 U/L
ALT SERPL-CCNC: 28 U/L
ANION GAP SERPL CALC-SCNC: 10 MMOL/L
AST SERPL-CCNC: 24 U/L
BILIRUB SERPL-MCNC: 0.7 MG/DL
BUN SERPL-MCNC: 17 MG/DL
CALCIUM SERPL-MCNC: 9.6 MG/DL
CEA SERPL-MCNC: 1 NG/ML
CHLORIDE SERPL-SCNC: 104 MMOL/L
CO2 SERPL-SCNC: 27 MMOL/L
CREAT SERPL-MCNC: 1.11 MG/DL
EGFR: 69 ML/MIN/1.73M2
GLUCOSE SERPL-MCNC: 105 MG/DL
POTASSIUM SERPL-SCNC: 4.4 MMOL/L
PROT SERPL-MCNC: 6.3 G/DL
SODIUM SERPL-SCNC: 142 MMOL/L

## 2025-02-06 ENCOUNTER — OUTPATIENT (OUTPATIENT)
Dept: OUTPATIENT SERVICES | Facility: HOSPITAL | Age: 75
LOS: 1 days | Discharge: HOME | End: 2025-02-06

## 2025-02-06 DIAGNOSIS — C18.9 MALIGNANT NEOPLASM OF COLON, UNSPECIFIED: ICD-10-CM

## 2025-02-06 DIAGNOSIS — Z98.890 OTHER SPECIFIED POSTPROCEDURAL STATES: Chronic | ICD-10-CM

## 2025-02-10 ENCOUNTER — RESULT REVIEW (OUTPATIENT)
Age: 75
End: 2025-02-10

## 2025-02-10 ENCOUNTER — NON-APPOINTMENT (OUTPATIENT)
Age: 75
End: 2025-02-10

## 2025-02-10 ENCOUNTER — APPOINTMENT (OUTPATIENT)
Dept: HEMATOLOGY ONCOLOGY | Facility: CLINIC | Age: 75
End: 2025-02-10
Payer: MEDICARE

## 2025-02-10 VITALS
DIASTOLIC BLOOD PRESSURE: 67 MMHG | WEIGHT: 194.65 LBS | RESPIRATION RATE: 16 BRPM | HEART RATE: 64 BPM | HEIGHT: 67.01 IN | OXYGEN SATURATION: 98 % | BODY MASS INDEX: 30.55 KG/M2 | SYSTOLIC BLOOD PRESSURE: 116 MMHG | TEMPERATURE: 97.5 F

## 2025-02-10 DIAGNOSIS — C18.6 MALIGNANT NEOPLASM OF DESCENDING COLON: ICD-10-CM

## 2025-02-10 LAB
BASOPHILS # BLD AUTO: 0.06 K/UL — SIGNIFICANT CHANGE UP (ref 0–0.2)
BASOPHILS NFR BLD AUTO: 0.6 % — SIGNIFICANT CHANGE UP (ref 0–2)
EOSINOPHIL # BLD AUTO: 0.42 K/UL — SIGNIFICANT CHANGE UP (ref 0–0.5)
EOSINOPHIL NFR BLD AUTO: 4 % — SIGNIFICANT CHANGE UP (ref 0–6)
HCT VFR BLD CALC: 44.6 % — SIGNIFICANT CHANGE UP (ref 39–50)
HGB BLD-MCNC: 15.8 G/DL — SIGNIFICANT CHANGE UP (ref 13–17)
IMM GRANULOCYTES NFR BLD AUTO: 0.9 % — SIGNIFICANT CHANGE UP (ref 0–0.9)
LYMPHOCYTES # BLD AUTO: 2.52 K/UL — SIGNIFICANT CHANGE UP (ref 1–3.3)
LYMPHOCYTES # BLD AUTO: 23.9 % — SIGNIFICANT CHANGE UP (ref 13–44)
MCHC RBC-ENTMCNC: 28.9 PG — SIGNIFICANT CHANGE UP (ref 27–34)
MCHC RBC-ENTMCNC: 35.4 G/DL — SIGNIFICANT CHANGE UP (ref 32–36)
MCV RBC AUTO: 81.9 FL — SIGNIFICANT CHANGE UP (ref 80–100)
MONOCYTES # BLD AUTO: 1.01 K/UL — HIGH (ref 0–0.9)
MONOCYTES NFR BLD AUTO: 9.6 % — SIGNIFICANT CHANGE UP (ref 2–14)
NEUTROPHILS # BLD AUTO: 6.45 K/UL — SIGNIFICANT CHANGE UP (ref 1.8–7.4)
NEUTROPHILS NFR BLD AUTO: 61 % — SIGNIFICANT CHANGE UP (ref 43–77)
NRBC # BLD: 0 /100 WBCS — SIGNIFICANT CHANGE UP (ref 0–0)
NRBC BLD-RTO: 0 /100 WBCS — SIGNIFICANT CHANGE UP (ref 0–0)
PLATELET # BLD AUTO: 156 K/UL — SIGNIFICANT CHANGE UP (ref 150–400)
RBC # BLD: 5.47 M/UL — SIGNIFICANT CHANGE UP (ref 4.2–5.8)
RBC # FLD: 13.3 % — SIGNIFICANT CHANGE UP (ref 10.3–14.5)
WBC # BLD: 10.55 K/UL — HIGH (ref 3.8–10.5)
WBC # FLD AUTO: 10.55 K/UL — HIGH (ref 3.8–10.5)

## 2025-02-10 PROCEDURE — 99213 OFFICE O/P EST LOW 20 MIN: CPT

## 2025-02-11 LAB
ALBUMIN SERPL ELPH-MCNC: 4.4 G/DL
ALP BLD-CCNC: 144 U/L
ALT SERPL-CCNC: 24 U/L
ANION GAP SERPL CALC-SCNC: 13 MMOL/L
AST SERPL-CCNC: 20 U/L
BILIRUB SERPL-MCNC: 0.6 MG/DL
BUN SERPL-MCNC: 16 MG/DL
CALCIUM SERPL-MCNC: 9.3 MG/DL
CEA SERPL-MCNC: 1.1 NG/ML
CHLORIDE SERPL-SCNC: 103 MMOL/L
CO2 SERPL-SCNC: 27 MMOL/L
CREAT SERPL-MCNC: 1.21 MG/DL
EGFR: 62 ML/MIN/1.73M2
GLUCOSE SERPL-MCNC: 111 MG/DL
POTASSIUM SERPL-SCNC: 3.8 MMOL/L
PROT SERPL-MCNC: 6.5 G/DL
SODIUM SERPL-SCNC: 142 MMOL/L

## 2025-03-03 ENCOUNTER — APPOINTMENT (OUTPATIENT)
Dept: CT IMAGING | Facility: IMAGING CENTER | Age: 75
End: 2025-03-03
Payer: MEDICARE

## 2025-03-03 ENCOUNTER — OUTPATIENT (OUTPATIENT)
Dept: OUTPATIENT SERVICES | Facility: HOSPITAL | Age: 75
LOS: 1 days | End: 2025-03-03
Payer: MEDICARE

## 2025-03-03 DIAGNOSIS — Z98.890 OTHER SPECIFIED POSTPROCEDURAL STATES: Chronic | ICD-10-CM

## 2025-03-03 PROCEDURE — 71260 CT THORAX DX C+: CPT

## 2025-03-03 PROCEDURE — 71260 CT THORAX DX C+: CPT | Mod: 26

## 2025-03-03 PROCEDURE — 74177 CT ABD & PELVIS W/CONTRAST: CPT

## 2025-03-03 PROCEDURE — 74177 CT ABD & PELVIS W/CONTRAST: CPT | Mod: 26

## 2025-03-14 ENCOUNTER — NON-APPOINTMENT (OUTPATIENT)
Age: 75
End: 2025-03-14

## 2025-03-21 ENCOUNTER — APPOINTMENT (OUTPATIENT)
Dept: HEMATOLOGY ONCOLOGY | Facility: CLINIC | Age: 75
End: 2025-03-21

## 2025-03-21 ENCOUNTER — RESULT REVIEW (OUTPATIENT)
Age: 75
End: 2025-03-21

## 2025-03-21 LAB
BASOPHILS # BLD AUTO: 0.06 K/UL — SIGNIFICANT CHANGE UP (ref 0–0.2)
BASOPHILS NFR BLD AUTO: 0.6 % — SIGNIFICANT CHANGE UP (ref 0–2)
EOSINOPHIL # BLD AUTO: 0.55 K/UL — HIGH (ref 0–0.5)
EOSINOPHIL NFR BLD AUTO: 5.5 % — SIGNIFICANT CHANGE UP (ref 0–6)
HCT VFR BLD CALC: 42.9 % — SIGNIFICANT CHANGE UP (ref 39–50)
HGB BLD-MCNC: 15.2 G/DL — SIGNIFICANT CHANGE UP (ref 13–17)
IMM GRANULOCYTES NFR BLD AUTO: 0.5 % — SIGNIFICANT CHANGE UP (ref 0–0.9)
LYMPHOCYTES # BLD AUTO: 2.5 K/UL — SIGNIFICANT CHANGE UP (ref 1–3.3)
LYMPHOCYTES # BLD AUTO: 24.8 % — SIGNIFICANT CHANGE UP (ref 13–44)
MCHC RBC-ENTMCNC: 28.4 PG — SIGNIFICANT CHANGE UP (ref 27–34)
MCHC RBC-ENTMCNC: 35.4 G/DL — SIGNIFICANT CHANGE UP (ref 32–36)
MCV RBC AUTO: 80 FL — SIGNIFICANT CHANGE UP (ref 80–100)
MONOCYTES # BLD AUTO: 0.74 K/UL — SIGNIFICANT CHANGE UP (ref 0–0.9)
MONOCYTES NFR BLD AUTO: 7.3 % — SIGNIFICANT CHANGE UP (ref 2–14)
NEUTROPHILS # BLD AUTO: 6.19 K/UL — SIGNIFICANT CHANGE UP (ref 1.8–7.4)
NEUTROPHILS NFR BLD AUTO: 61.3 % — SIGNIFICANT CHANGE UP (ref 43–77)
NRBC BLD AUTO-RTO: 0 /100 WBCS — SIGNIFICANT CHANGE UP (ref 0–0)
PLATELET # BLD AUTO: 155 K/UL — SIGNIFICANT CHANGE UP (ref 150–400)
RBC # BLD: 5.36 M/UL — SIGNIFICANT CHANGE UP (ref 4.2–5.8)
RBC # FLD: 14.4 % — SIGNIFICANT CHANGE UP (ref 10.3–14.5)
WBC # BLD: 10.09 K/UL — SIGNIFICANT CHANGE UP (ref 3.8–10.5)
WBC # FLD AUTO: 10.09 K/UL — SIGNIFICANT CHANGE UP (ref 3.8–10.5)

## 2025-03-22 LAB
ALBUMIN SERPL ELPH-MCNC: 4.3 G/DL
ALP BLD-CCNC: 128 U/L
ALT SERPL-CCNC: 31 U/L
ANION GAP SERPL CALC-SCNC: 14 MMOL/L
AST SERPL-CCNC: 30 U/L
BILIRUB SERPL-MCNC: 1 MG/DL
BUN SERPL-MCNC: 14 MG/DL
CALCIUM SERPL-MCNC: 9.8 MG/DL
CEA SERPL-MCNC: 1.3 NG/ML
CHLORIDE SERPL-SCNC: 103 MMOL/L
CO2 SERPL-SCNC: 24 MMOL/L
CREAT SERPL-MCNC: 1.14 MG/DL
EGFRCR SERPLBLD CKD-EPI 2021: 67 ML/MIN/1.73M2
GLUCOSE SERPL-MCNC: 138 MG/DL
POTASSIUM SERPL-SCNC: 3.6 MMOL/L
PROT SERPL-MCNC: 6.3 G/DL
SODIUM SERPL-SCNC: 141 MMOL/L

## 2025-05-07 ENCOUNTER — OUTPATIENT (OUTPATIENT)
Dept: OUTPATIENT SERVICES | Facility: HOSPITAL | Age: 75
LOS: 1 days | Discharge: HOME | End: 2025-05-07

## 2025-05-07 DIAGNOSIS — Z98.890 OTHER SPECIFIED POSTPROCEDURAL STATES: Chronic | ICD-10-CM

## 2025-05-07 DIAGNOSIS — C18.9 MALIGNANT NEOPLASM OF COLON, UNSPECIFIED: ICD-10-CM

## 2025-05-12 ENCOUNTER — RESULT REVIEW (OUTPATIENT)
Age: 75
End: 2025-05-12

## 2025-05-12 ENCOUNTER — APPOINTMENT (OUTPATIENT)
Dept: HEMATOLOGY ONCOLOGY | Facility: CLINIC | Age: 75
End: 2025-05-12
Payer: MEDICARE

## 2025-05-12 VITALS
HEIGHT: 67 IN | TEMPERATURE: 97.3 F | HEART RATE: 64 BPM | RESPIRATION RATE: 16 BRPM | BODY MASS INDEX: 29.82 KG/M2 | WEIGHT: 190 LBS | OXYGEN SATURATION: 96 %

## 2025-05-12 DIAGNOSIS — E27.8 OTHER SPECIFIED DISORDERS OF ADRENAL GLAND: ICD-10-CM

## 2025-05-12 DIAGNOSIS — N28.89 OTHER SPECIFIED DISORDERS OF KIDNEY AND URETER: ICD-10-CM

## 2025-05-12 DIAGNOSIS — C18.6 MALIGNANT NEOPLASM OF DESCENDING COLON: ICD-10-CM

## 2025-05-12 LAB
BASOPHILS # BLD AUTO: 0.09 K/UL — SIGNIFICANT CHANGE UP (ref 0–0.2)
BASOPHILS NFR BLD AUTO: 0.7 % — SIGNIFICANT CHANGE UP (ref 0–2)
EOSINOPHIL # BLD AUTO: 0.5 K/UL — SIGNIFICANT CHANGE UP (ref 0–0.5)
EOSINOPHIL NFR BLD AUTO: 4 % — SIGNIFICANT CHANGE UP (ref 0–6)
HCT VFR BLD CALC: 44 % — SIGNIFICANT CHANGE UP (ref 39–50)
HGB BLD-MCNC: 16.1 G/DL — SIGNIFICANT CHANGE UP (ref 13–17)
IMM GRANULOCYTES NFR BLD AUTO: 0.9 % — SIGNIFICANT CHANGE UP (ref 0–0.9)
LYMPHOCYTES # BLD AUTO: 25.2 % — SIGNIFICANT CHANGE UP (ref 13–44)
LYMPHOCYTES # BLD AUTO: 3.18 K/UL — SIGNIFICANT CHANGE UP (ref 1–3.3)
MCHC RBC-ENTMCNC: 30.1 PG — SIGNIFICANT CHANGE UP (ref 27–34)
MCHC RBC-ENTMCNC: 36.6 G/DL — HIGH (ref 32–36)
MCV RBC AUTO: 82.2 FL — SIGNIFICANT CHANGE UP (ref 80–100)
MONOCYTES # BLD AUTO: 0.96 K/UL — HIGH (ref 0–0.9)
MONOCYTES NFR BLD AUTO: 7.6 % — SIGNIFICANT CHANGE UP (ref 2–14)
NEUTROPHILS # BLD AUTO: 7.77 K/UL — HIGH (ref 1.8–7.4)
NEUTROPHILS NFR BLD AUTO: 61.6 % — SIGNIFICANT CHANGE UP (ref 43–77)
NRBC BLD AUTO-RTO: 0 /100 WBCS — SIGNIFICANT CHANGE UP (ref 0–0)
PLATELET # BLD AUTO: 161 K/UL — SIGNIFICANT CHANGE UP (ref 150–400)
RBC # BLD: 5.35 M/UL — SIGNIFICANT CHANGE UP (ref 4.2–5.8)
RBC # FLD: 13.8 % — SIGNIFICANT CHANGE UP (ref 10.3–14.5)
WBC # BLD: 12.61 K/UL — HIGH (ref 3.8–10.5)
WBC # FLD AUTO: 12.61 K/UL — HIGH (ref 3.8–10.5)

## 2025-05-12 PROCEDURE — 99214 OFFICE O/P EST MOD 30 MIN: CPT

## 2025-05-25 LAB
ALBUMIN SERPL ELPH-MCNC: 4.6 G/DL
ALP BLD-CCNC: 127 U/L
ALT SERPL-CCNC: 34 U/L
ANION GAP SERPL CALC-SCNC: 15 MMOL/L
AST SERPL-CCNC: 31 U/L
BILIRUB SERPL-MCNC: 0.8 MG/DL
BUN SERPL-MCNC: 17 MG/DL
CALCIUM SERPL-MCNC: 9.7 MG/DL
CEA SERPL-MCNC: 1.2 NG/ML
CHLORIDE SERPL-SCNC: 102 MMOL/L
CO2 SERPL-SCNC: 24 MMOL/L
CREAT SERPL-MCNC: 1.06 MG/DL
EGFRCR SERPLBLD CKD-EPI 2021: 73 ML/MIN/1.73M2
GLUCOSE SERPL-MCNC: 162 MG/DL
POTASSIUM SERPL-SCNC: 4.3 MMOL/L
PROT SERPL-MCNC: 6.6 G/DL
SODIUM SERPL-SCNC: 141 MMOL/L

## 2025-09-16 ENCOUNTER — APPOINTMENT (OUTPATIENT)
Dept: HEMATOLOGY ONCOLOGY | Facility: CLINIC | Age: 75
End: 2025-09-16
Payer: MEDICARE

## 2025-09-16 VITALS
HEIGHT: 67 IN | HEART RATE: 50 BPM | TEMPERATURE: 97.3 F | OXYGEN SATURATION: 97 % | BODY MASS INDEX: 29.03 KG/M2 | RESPIRATION RATE: 16 BRPM | DIASTOLIC BLOOD PRESSURE: 87 MMHG | WEIGHT: 185 LBS | SYSTOLIC BLOOD PRESSURE: 130 MMHG

## 2025-09-16 DIAGNOSIS — E27.8 OTHER SPECIFIED DISORDERS OF ADRENAL GLAND: ICD-10-CM

## 2025-09-16 DIAGNOSIS — N28.89 OTHER SPECIFIED DISORDERS OF KIDNEY AND URETER: ICD-10-CM

## 2025-09-16 DIAGNOSIS — C18.6 MALIGNANT NEOPLASM OF DESCENDING COLON: ICD-10-CM

## 2025-09-16 PROCEDURE — 99213 OFFICE O/P EST LOW 20 MIN: CPT

## (undated) DEVICE — DRAPE LEGGINGS XL

## (undated) DEVICE — SUT CHROMIC 0 36" GS-21

## (undated) DEVICE — PACK CYSTO

## (undated) DEVICE — ACMI SELF-SEALING SEAL UP TO 7FR

## (undated) DEVICE — TUBING INSUFFLATION LAP FILTER 10FT

## (undated) DEVICE — OSTOMY KIT 2-PIECE 2.25" NS (RED)

## (undated) DEVICE — GLV 8.5 PROTEXIS (WHITE)

## (undated) DEVICE — LIGASURE BLUNT TIP 37CM

## (undated) DEVICE — FOLEY CATH 2-WAY 28FR 30CC SILICONE

## (undated) DEVICE — Device

## (undated) DEVICE — PACK COLON BUNDLE

## (undated) DEVICE — SUT MAXON 1 36" GS-24

## (undated) DEVICE — TAPE SILK 3"

## (undated) DEVICE — TROCAR APPLIED MEDICAL KII BALLOON BLUNT TIP 12MM X 100MM

## (undated) DEVICE — VENODYNE/SCD SLEEVE CALF MEDIUM

## (undated) DEVICE — GOWN TRIMAX LG

## (undated) DEVICE — BLADE SCALPEL SAFETYLOCK #15

## (undated) DEVICE — TUBING STRYKEFLOW II SUCTION / IRRIGATOR

## (undated) DEVICE — GLV 6.5 PROTEXIS (WHITE)

## (undated) DEVICE — PREP BETADINE KIT

## (undated) DEVICE — DRAPE GENERAL ENDOSCOPY

## (undated) DEVICE — SOL IRR POUR NS 0.9% 500ML

## (undated) DEVICE — SYR ASEPTO

## (undated) DEVICE — TUBING RANGER FLUID IRRIGATION SET DISP

## (undated) DEVICE — SOL IRR BAG H2O 3000ML

## (undated) DEVICE — PACK MAJOR ABDOMINAL SUPINE

## (undated) DEVICE — DRAPE TOWEL BLUE 17" X 24"

## (undated) DEVICE — SUT POLYSORB 3-0 30" V-20 UNDYED

## (undated) DEVICE — WARMING BLANKET UPPER ADULT

## (undated) DEVICE — SYR LUER LOK 30CC

## (undated) DEVICE — VALVE YELLOW PORT SEAL PLUS 5MM

## (undated) DEVICE — POSITIONER FOAM EGG CRATE ULNAR 2PCS (PINK)

## (undated) DEVICE — POSITIONER FOAM HEADREST (PINK)

## (undated) DEVICE — DRSG OPSITE 2.5 X 2"

## (undated) DEVICE — GELPORT LAPAROSCOPIC SYSTEM

## (undated) DEVICE — SOL IRR POUR H2O 250ML

## (undated) DEVICE — ELCTR BOVIE PENCIL SMOKE EVACUATION

## (undated) DEVICE — SPECIMEN CONTAINER 100ML

## (undated) DEVICE — D HELP - CLEARVIEW CLEARIFY SYSTEM

## (undated) DEVICE — DRAPE LINGEMAN TUR

## (undated) DEVICE — SUT CHROMIC 3-0 30" V-20

## (undated) DEVICE — VISTASEAL DUAL APPLICATOR

## (undated) DEVICE — GLV 8 PROTEXIS (WHITE)

## (undated) DEVICE — SOL IRR BAG NS 0.9% 3000ML

## (undated) DEVICE — FOLEY TRAY 16FR 5CC LTX UMETER CLOSED

## (undated) DEVICE — LIGASURE IMPACT

## (undated) DEVICE — GLV 7 PROTEXIS (WHITE)

## (undated) DEVICE — DRAPE 1/2 SHEET 40X57"

## (undated) DEVICE — SUT SURGIPRO 0 30" GS-22

## (undated) DEVICE — WARMING BLANKET FULL ADULT

## (undated) DEVICE — GLV 7.5 PROTEXIS (WHITE)

## (undated) DEVICE — ADAPTER URETHRAL CATH CONNECTING

## (undated) DEVICE — DRSG TAPE UMBILICAL COTTON 2" X 30 X 1/8"

## (undated) DEVICE — SUT CHROMIC 1 30" GS-21

## (undated) DEVICE — SUT POLYSORB 0 36" GU-46

## (undated) DEVICE — TROCAR COVIDIEN VERSAPORT BLADELESS OPTICAL 5MM STANDARD

## (undated) DEVICE — DRSG OPSITE 13.75 X 4"

## (undated) DEVICE — DRSG TELFA 3 X 8